# Patient Record
Sex: MALE | Race: WHITE | NOT HISPANIC OR LATINO | Employment: UNEMPLOYED | URBAN - METROPOLITAN AREA
[De-identification: names, ages, dates, MRNs, and addresses within clinical notes are randomized per-mention and may not be internally consistent; named-entity substitution may affect disease eponyms.]

---

## 2019-04-14 ENCOUNTER — HOSPITAL ENCOUNTER (EMERGENCY)
Facility: HOSPITAL | Age: 59
Discharge: HOME | End: 2019-04-14
Payer: MEDICAID

## 2019-04-14 PROCEDURE — 99283 EMERGENCY DEPT VISIT LOW MDM: CPT

## 2019-09-06 ENCOUNTER — HOSPITAL ENCOUNTER (EMERGENCY)
Facility: HOSPITAL | Age: 59
Discharge: HOME | End: 2019-09-06
Attending: EMERGENCY MEDICINE
Payer: MEDICAID

## 2019-09-06 ENCOUNTER — TELEPHONE (OUTPATIENT)
Dept: ORTHOPEDICS | Facility: CLINIC | Age: 59
End: 2019-09-06

## 2019-09-06 VITALS
WEIGHT: 298 LBS | HEIGHT: 78 IN | HEART RATE: 77 BPM | SYSTOLIC BLOOD PRESSURE: 131 MMHG | TEMPERATURE: 98 F | OXYGEN SATURATION: 95 % | BODY MASS INDEX: 34.48 KG/M2 | DIASTOLIC BLOOD PRESSURE: 78 MMHG | RESPIRATION RATE: 20 BRPM

## 2019-09-06 DIAGNOSIS — M25.569 KNEE PAIN: ICD-10-CM

## 2019-09-06 PROCEDURE — 99283 EMERGENCY DEPT VISIT LOW MDM: CPT | Mod: 25

## 2019-09-06 PROCEDURE — 29505 APPLICATION LONG LEG SPLINT: CPT | Mod: LT

## 2019-09-06 RX ORDER — ESCITALOPRAM OXALATE 20 MG/1
20 TABLET ORAL DAILY
COMMUNITY

## 2019-09-06 NOTE — TELEPHONE ENCOUNTER
Spoke w/ patient. The patient was given the medicaid hotline. -DD                  ----- Message from Ramona Alvarez sent at 9/6/2019  2:57 PM CDT -----  Contact: Yenni  Patient was seen in ER/[facility] and was referred to Ortho for two weeks follow up for a torn ACL and Meniscus, Please call him at [phone] or [phone].    Thanks  Td

## 2019-09-06 NOTE — ED PROVIDER NOTES
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 Pt reports he was working on a go cart when one side of the work horse he was working on gave away partially causing the go cart to slide into the lateral aspect of his left knee. Pt reports the pain as an aching pain. The pain does not radiate. Pt reports movement worsens the pain. Pt reports the pain is relieved mildly with rest. Pt denies any headache, chest pain, shortness of breath, chills, fatigue, head trauma, dysuria, incontinence     The history is provided by the patient.     Review of patient's allergies indicates:  No Known Allergies  Past Medical History:   Diagnosis Date    Depression      Past Surgical History:   Procedure Laterality Date    right knee surgery       History reviewed. No pertinent family history.  Social History     Tobacco Use    Smoking status: Current Every Day Smoker     Types: Cigarettes   Substance Use Topics    Alcohol use: Never     Frequency: Never    Drug use: Not on file     Review of Systems   Constitutional: Negative for chills, fatigue and fever.   HENT: Negative for sore throat.    Respiratory: Negative for shortness of breath.    Cardiovascular: Negative for chest pain.   Gastrointestinal: Negative for abdominal pain and nausea.   Genitourinary: Negative for dysuria.   Musculoskeletal: Positive for arthralgias (left knee). Negative for back pain.   Skin: Negative for rash.   Neurological: Negative for dizziness, weakness, light-headedness and headaches.   Hematological: Does not bruise/bleed easily.       Physical Exam     Initial Vitals [09/06/19 1130]   BP Pulse Resp Temp SpO2   (!) 150/76 78 20 98.4 °F (36.9 °C) 95 %      MAP       --         Physical Exam    Nursing note and  vitals reviewed.  Constitutional: He appears well-developed and well-nourished. He is not diaphoretic. No distress.   HENT:   Head: Normocephalic and atraumatic.   Eyes: Conjunctivae are normal. Right eye exhibits no discharge. Left eye exhibits no discharge.   Neck: Normal range of motion. Neck supple.   Cardiovascular: Normal rate and regular rhythm.   Pulmonary/Chest: Breath sounds normal. No respiratory distress. He has no wheezes. He has no rhonchi. He has no rales.   Abdominal: Soft. Bowel sounds are normal. There is no tenderness.   Musculoskeletal: He exhibits tenderness (left lateral knee TTP. pain with ROM left knee. no swelling, bruising, or erythema noted).   Neurological: He is alert and oriented to person, place, and time.   Skin: Skin is warm and dry.   Psychiatric: He has a normal mood and affect. His behavior is normal. Thought content normal.         ED Course   Orthopedic Injury  Date/Time: 9/6/2019 12:31 PM  Performed by: Keanu Teague NP  Authorized by: Jose Alejandro Muhammad Jr., MD     Consent Done?:  Yes  Universal Protocol:     Verbal consent obtained?: Yes      Risks and benefits: Risks, benefits and alternatives were discussed      Consent given by:  Patient    Patient states understanding of procedure being performed: Yes      Patient's understanding of procedure matches consent: Yes      Patient identity confirmed:  Name    Time Out: Immediately prior to the procedure a time out was called    Injury:     Injury location:  Knee    Location details:  Left knee      Pre-procedure assessment:     Neurovascular status: Neurovascularly intact      Distal perfusion: normal      Neurological function: normal      Range of motion: normal      Local anesthesia used?: No      Patient sedated?: No        Selections made in this section will also lock the Injury type section above.:     Immobilization:  Brace    Splint type: knee immobilizer.  Post-procedure assessment:     Neurovascular status:  Neurovascularly intact      Distal perfusion: normal      Neurological function: normal      Range of motion: normal      Patient tolerance:  Patient tolerated the procedure well with no immediate complications      Labs Reviewed - No data to display       Imaging Results          X-Ray Knee Complete 4 or More Views Left (Final result)  Result time 09/06/19 12:24:36    Final result by Oscar Snow MD (09/06/19 12:24:36)                 Impression:      No acute fracture or dislocation.    Moderate tricompartment degenerative changes and small joint effusion.      Electronically signed by: Oscar Snow MD  Date:    09/06/2019  Time:    12:24             Narrative:    EXAMINATION:  XR KNEE COMP 4 OR MORE VIEWS LEFT    CLINICAL HISTORY:  Pain in unspecified knee    COMPARISON:  None    FINDINGS:  No evidence of acute fracture or dislocation.  Bony mineralization is normal.  Soft tissues are unremarkable. Lateral view of the left knee demonstrates small joint effusion.    Moderate tricompartment degenerative changes greatest within the medial compartment with joint space narrowing sclerosis and marginal osteophytes.                                     12:32 PM  Results discussed with pt at this time. Follow up care addressed. All pt questions and concerns addressed. Pt instructed to return for any worsening or concerns. Pt verbalized understanding                  Clinical Impression:       ICD-10-CM ICD-9-CM   1. Knee pain M25.569 719.46                                Keanu Teague, KATERIN  09/06/19 1289

## 2021-08-24 LAB
CTP QC/QA: YES
SARS-COV-2 RDRP RESP QL NAA+PROBE: POSITIVE

## 2021-08-24 PROCEDURE — U0002 COVID-19 LAB TEST NON-CDC: HCPCS | Performed by: NURSE PRACTITIONER

## 2021-08-24 PROCEDURE — 99285 EMERGENCY DEPT VISIT HI MDM: CPT | Mod: 25

## 2021-08-25 ENCOUNTER — HOSPITAL ENCOUNTER (EMERGENCY)
Facility: HOSPITAL | Age: 61
Discharge: HOME | End: 2021-08-25
Attending: EMERGENCY MEDICINE
Payer: MEDICAID

## 2021-08-25 VITALS
RESPIRATION RATE: 24 BRPM | TEMPERATURE: 99 F | BODY MASS INDEX: 35.87 KG/M2 | HEART RATE: 110 BPM | WEIGHT: 310 LBS | SYSTOLIC BLOOD PRESSURE: 123 MMHG | OXYGEN SATURATION: 94 % | DIASTOLIC BLOOD PRESSURE: 60 MMHG | HEIGHT: 78 IN

## 2021-08-25 DIAGNOSIS — Z20.822 SUSPECTED COVID-19 VIRUS INFECTION: ICD-10-CM

## 2021-08-25 DIAGNOSIS — R06.02 SOB (SHORTNESS OF BREATH): ICD-10-CM

## 2021-08-25 DIAGNOSIS — U07.1 PNEUMONIA DUE TO COVID-19 VIRUS: Primary | ICD-10-CM

## 2021-08-25 DIAGNOSIS — J12.82 PNEUMONIA DUE TO COVID-19 VIRUS: Primary | ICD-10-CM

## 2021-08-25 LAB
ALBUMIN SERPL BCP-MCNC: 3.8 G/DL (ref 3.5–5.2)
ALP SERPL-CCNC: 44 U/L (ref 55–135)
ALT SERPL W/O P-5'-P-CCNC: 53 U/L (ref 10–44)
ANION GAP SERPL CALC-SCNC: 13 MMOL/L (ref 8–16)
AST SERPL-CCNC: 44 U/L (ref 10–40)
BASOPHILS # BLD AUTO: 0.04 K/UL (ref 0–0.2)
BASOPHILS NFR BLD: 0.7 % (ref 0–1.9)
BILIRUB SERPL-MCNC: 0.7 MG/DL (ref 0.1–1)
BUN SERPL-MCNC: 14 MG/DL (ref 8–23)
CALCIUM SERPL-MCNC: 9.5 MG/DL (ref 8.7–10.5)
CHLORIDE SERPL-SCNC: 104 MMOL/L (ref 95–110)
CK SERPL-CCNC: 121 U/L (ref 20–200)
CO2 SERPL-SCNC: 21 MMOL/L (ref 23–29)
CREAT SERPL-MCNC: 0.9 MG/DL (ref 0.5–1.4)
CRP SERPL-MCNC: 3.2 MG/L (ref 0–8.2)
DIFFERENTIAL METHOD: ABNORMAL
EOSINOPHIL # BLD AUTO: 0.1 K/UL (ref 0–0.5)
EOSINOPHIL NFR BLD: 1.3 % (ref 0–8)
ERYTHROCYTE [DISTWIDTH] IN BLOOD BY AUTOMATED COUNT: 13.7 % (ref 11.5–14.5)
EST. GFR  (AFRICAN AMERICAN): >60 ML/MIN/1.73 M^2
EST. GFR  (NON AFRICAN AMERICAN): >60 ML/MIN/1.73 M^2
FERRITIN SERPL-MCNC: 3516 NG/ML (ref 20–300)
GLUCOSE SERPL-MCNC: 131 MG/DL (ref 70–110)
HCT VFR BLD AUTO: 51.3 % (ref 40–54)
HCV AB SERPL QL IA: NEGATIVE
HEP C VIRUS HOLD SPECIMEN: NORMAL
HGB BLD-MCNC: 17.4 G/DL (ref 14–18)
HIV 1+2 AB+HIV1 P24 AG SERPL QL IA: NEGATIVE
IMM GRANULOCYTES # BLD AUTO: 0.01 K/UL (ref 0–0.04)
IMM GRANULOCYTES NFR BLD AUTO: 0.2 % (ref 0–0.5)
LACTATE SERPL-SCNC: 2.3 MMOL/L (ref 0.5–2.2)
LDH SERPL L TO P-CCNC: 157 U/L (ref 110–260)
LYMPHOCYTES # BLD AUTO: 1.6 K/UL (ref 1–4.8)
LYMPHOCYTES NFR BLD: 28.1 % (ref 18–48)
MCH RBC QN AUTO: 32.3 PG (ref 27–31)
MCHC RBC AUTO-ENTMCNC: 33.9 G/DL (ref 32–36)
MCV RBC AUTO: 95 FL (ref 82–98)
MONOCYTES # BLD AUTO: 0.8 K/UL (ref 0.3–1)
MONOCYTES NFR BLD: 13.7 % (ref 4–15)
NEUTROPHILS # BLD AUTO: 3.1 K/UL (ref 1.8–7.7)
NEUTROPHILS NFR BLD: 56 % (ref 38–73)
NRBC BLD-RTO: 0 /100 WBC
PLATELET # BLD AUTO: 168 K/UL (ref 150–450)
PMV BLD AUTO: 9.8 FL (ref 9.2–12.9)
POTASSIUM SERPL-SCNC: 3.7 MMOL/L (ref 3.5–5.1)
PROT SERPL-MCNC: 7.4 G/DL (ref 6–8.4)
RBC # BLD AUTO: 5.39 M/UL (ref 4.6–6.2)
SODIUM SERPL-SCNC: 138 MMOL/L (ref 136–145)
TROPONIN I SERPL DL<=0.01 NG/ML-MCNC: 0.01 NG/ML (ref 0–0.03)
WBC # BLD AUTO: 5.55 K/UL (ref 3.9–12.7)

## 2021-08-25 PROCEDURE — 83615 LACTATE (LD) (LDH) ENZYME: CPT | Performed by: EMERGENCY MEDICINE

## 2021-08-25 PROCEDURE — 83605 ASSAY OF LACTIC ACID: CPT | Performed by: EMERGENCY MEDICINE

## 2021-08-25 PROCEDURE — 99900035 HC TECH TIME PER 15 MIN (STAT)

## 2021-08-25 PROCEDURE — 93010 EKG 12-LEAD: ICD-10-PCS | Mod: ,,, | Performed by: INTERNAL MEDICINE

## 2021-08-25 PROCEDURE — 85025 COMPLETE CBC W/AUTO DIFF WBC: CPT | Performed by: EMERGENCY MEDICINE

## 2021-08-25 PROCEDURE — 86803 HEPATITIS C AB TEST: CPT | Performed by: EMERGENCY MEDICINE

## 2021-08-25 PROCEDURE — 80053 COMPREHEN METABOLIC PANEL: CPT | Performed by: EMERGENCY MEDICINE

## 2021-08-25 PROCEDURE — 82728 ASSAY OF FERRITIN: CPT | Performed by: EMERGENCY MEDICINE

## 2021-08-25 PROCEDURE — 87389 HIV-1 AG W/HIV-1&-2 AB AG IA: CPT | Performed by: EMERGENCY MEDICINE

## 2021-08-25 PROCEDURE — 93010 ELECTROCARDIOGRAM REPORT: CPT | Mod: ,,, | Performed by: INTERNAL MEDICINE

## 2021-08-25 PROCEDURE — 86140 C-REACTIVE PROTEIN: CPT | Performed by: EMERGENCY MEDICINE

## 2021-08-25 PROCEDURE — 94799 UNLISTED PULMONARY SVC/PX: CPT

## 2021-08-25 PROCEDURE — 84484 ASSAY OF TROPONIN QUANT: CPT | Performed by: EMERGENCY MEDICINE

## 2021-08-25 PROCEDURE — 82550 ASSAY OF CK (CPK): CPT | Performed by: EMERGENCY MEDICINE

## 2021-08-25 PROCEDURE — 93005 ELECTROCARDIOGRAM TRACING: CPT

## 2021-08-25 RX ORDER — LATANOPROST 50 UG/ML
SOLUTION/ DROPS OPHTHALMIC
COMMUNITY

## 2021-08-25 RX ORDER — ALBUTEROL SULFATE 90 UG/1
2 AEROSOL, METERED RESPIRATORY (INHALATION) EVERY 4 HOURS PRN
Qty: 607 G | Refills: 0 | Status: SHIPPED | OUTPATIENT
Start: 2021-08-25 | End: 2022-08-25

## 2021-08-25 RX ORDER — DORZOLAMIDE HYDROCHLORIDE AND TIMOLOL MALEATE 20; 5 MG/ML; MG/ML
SOLUTION/ DROPS OPHTHALMIC
COMMUNITY

## 2021-08-26 ENCOUNTER — INFUSION (OUTPATIENT)
Dept: INFECTIOUS DISEASES | Facility: HOSPITAL | Age: 61
End: 2021-08-26
Attending: EMERGENCY MEDICINE
Payer: MEDICAID

## 2021-08-26 VITALS
HEART RATE: 85 BPM | SYSTOLIC BLOOD PRESSURE: 122 MMHG | OXYGEN SATURATION: 94 % | RESPIRATION RATE: 19 BRPM | DIASTOLIC BLOOD PRESSURE: 75 MMHG | TEMPERATURE: 99 F

## 2021-08-26 DIAGNOSIS — U07.1 PNEUMONIA DUE TO COVID-19 VIRUS: ICD-10-CM

## 2021-08-26 DIAGNOSIS — J12.82 PNEUMONIA DUE TO COVID-19 VIRUS: ICD-10-CM

## 2021-08-26 DIAGNOSIS — U07.1 COVID-19: Primary | ICD-10-CM

## 2021-08-26 PROCEDURE — M0243 CASIRIVI AND IMDEVI INFUSION: HCPCS | Performed by: INTERNAL MEDICINE

## 2021-08-26 PROCEDURE — 25000003 PHARM REV CODE 250: Performed by: INTERNAL MEDICINE

## 2021-08-26 PROCEDURE — 63600175 PHARM REV CODE 636 W HCPCS: Performed by: INTERNAL MEDICINE

## 2021-08-26 RX ORDER — EPINEPHRINE 0.3 MG/.3ML
0.3 INJECTION SUBCUTANEOUS
Status: ACTIVE | OUTPATIENT
Start: 2021-08-26

## 2021-08-26 RX ORDER — SODIUM CHLORIDE 0.9 % (FLUSH) 0.9 %
10 SYRINGE (ML) INJECTION
Status: ACTIVE | OUTPATIENT
Start: 2021-08-26

## 2021-08-26 RX ORDER — ACETAMINOPHEN 325 MG/1
650 TABLET ORAL ONCE AS NEEDED
Status: ACTIVE | OUTPATIENT
Start: 2021-08-26 | End: 2033-01-22

## 2021-08-26 RX ORDER — DIPHENHYDRAMINE HYDROCHLORIDE 50 MG/ML
25 INJECTION INTRAMUSCULAR; INTRAVENOUS ONCE AS NEEDED
Status: ACTIVE | OUTPATIENT
Start: 2021-08-26 | End: 2033-01-22

## 2021-08-26 RX ORDER — ALBUTEROL SULFATE 90 UG/1
2 AEROSOL, METERED RESPIRATORY (INHALATION)
Status: ACTIVE | OUTPATIENT
Start: 2021-08-26

## 2021-08-26 RX ORDER — ONDANSETRON 4 MG/1
4 TABLET, ORALLY DISINTEGRATING ORAL ONCE AS NEEDED
Status: ACTIVE | OUTPATIENT
Start: 2021-08-26 | End: 2033-01-22

## 2021-08-26 RX ADMIN — CASIRIVIMAB AND IMDEVIMAB 600 MG: 600; 600 INJECTION, SOLUTION, CONCENTRATE INTRAVENOUS at 11:08

## 2025-05-12 ENCOUNTER — HOSPITAL ENCOUNTER (EMERGENCY)
Facility: HOSPITAL | Age: 65
Discharge: HOME | End: 2025-05-13
Attending: EMERGENCY MEDICINE
Payer: MEDICARE

## 2025-05-12 DIAGNOSIS — R07.9 CHEST PAIN: ICD-10-CM

## 2025-05-12 LAB
ABSOLUTE EOSINOPHIL (OHS): 0.42 K/UL
ABSOLUTE MONOCYTE (OHS): 0.98 K/UL (ref 0.3–1)
ABSOLUTE NEUTROPHIL COUNT (OHS): 4.44 K/UL (ref 1.8–7.7)
ALBUMIN SERPL BCP-MCNC: 4 G/DL (ref 3.5–5.2)
ALP SERPL-CCNC: 48 UNIT/L (ref 40–150)
ALT SERPL W/O P-5'-P-CCNC: 36 UNIT/L (ref 10–44)
ANION GAP (OHS): 9 MMOL/L (ref 8–16)
AST SERPL-CCNC: 27 UNIT/L (ref 11–45)
BASOPHILS # BLD AUTO: 0.05 K/UL
BASOPHILS NFR BLD AUTO: 0.5 %
BILIRUB SERPL-MCNC: 1.1 MG/DL (ref 0.1–1)
BUN SERPL-MCNC: 16 MG/DL (ref 8–23)
CALCIUM SERPL-MCNC: 9.5 MG/DL (ref 8.7–10.5)
CHLORIDE SERPL-SCNC: 108 MMOL/L (ref 95–110)
CO2 SERPL-SCNC: 23 MMOL/L (ref 23–29)
CREAT SERPL-MCNC: 0.8 MG/DL (ref 0.5–1.4)
D DIMER PPP IA.FEU-MCNC: 0.43 MG/L FEU
ERYTHROCYTE [DISTWIDTH] IN BLOOD BY AUTOMATED COUNT: 13.1 % (ref 11.5–14.5)
GFR SERPLBLD CREATININE-BSD FMLA CKD-EPI: >60 ML/MIN/1.73/M2
GLUCOSE SERPL-MCNC: 109 MG/DL (ref 70–110)
HCT VFR BLD AUTO: 51.5 % (ref 40–54)
HCV AB SERPL QL IA: NEGATIVE
HGB BLD-MCNC: 17 GM/DL (ref 14–18)
HIV 1+2 AB+HIV1 P24 AG SERPL QL IA: NEGATIVE
HOLD SPECIMEN: NORMAL
IMM GRANULOCYTES # BLD AUTO: 0.02 K/UL (ref 0–0.04)
IMM GRANULOCYTES NFR BLD AUTO: 0.2 % (ref 0–0.5)
LYMPHOCYTES # BLD AUTO: 3.23 K/UL (ref 1–4.8)
MCH RBC QN AUTO: 32.5 PG (ref 27–31)
MCHC RBC AUTO-ENTMCNC: 33 G/DL (ref 32–36)
MCV RBC AUTO: 99 FL (ref 82–98)
NUCLEATED RBC (/100WBC) (OHS): 0 /100 WBC
PLATELET # BLD AUTO: 237 K/UL (ref 150–450)
PMV BLD AUTO: 9.8 FL (ref 9.2–12.9)
POTASSIUM SERPL-SCNC: 4 MMOL/L (ref 3.5–5.1)
PROT SERPL-MCNC: 7.9 GM/DL (ref 6–8.4)
RBC # BLD AUTO: 5.23 M/UL (ref 4.6–6.2)
RELATIVE EOSINOPHIL (OHS): 4.6 %
RELATIVE LYMPHOCYTE (OHS): 35.3 % (ref 18–48)
RELATIVE MONOCYTE (OHS): 10.7 % (ref 4–15)
RELATIVE NEUTROPHIL (OHS): 48.7 % (ref 38–73)
SODIUM SERPL-SCNC: 140 MMOL/L (ref 136–145)
TROPONIN I SERPL DL<=0.01 NG/ML-MCNC: <0.006 NG/ML
TROPONIN I SERPL DL<=0.01 NG/ML-MCNC: <0.006 NG/ML
WBC # BLD AUTO: 9.14 K/UL (ref 3.9–12.7)

## 2025-05-12 PROCEDURE — 86803 HEPATITIS C AB TEST: CPT | Performed by: EMERGENCY MEDICINE

## 2025-05-12 PROCEDURE — 80053 COMPREHEN METABOLIC PANEL: CPT

## 2025-05-12 PROCEDURE — 93010 ELECTROCARDIOGRAM REPORT: CPT | Mod: ,,, | Performed by: INTERNAL MEDICINE

## 2025-05-12 PROCEDURE — 25000003 PHARM REV CODE 250: Performed by: EMERGENCY MEDICINE

## 2025-05-12 PROCEDURE — 63600175 PHARM REV CODE 636 W HCPCS: Performed by: EMERGENCY MEDICINE

## 2025-05-12 PROCEDURE — 85379 FIBRIN DEGRADATION QUANT: CPT

## 2025-05-12 PROCEDURE — 93005 ELECTROCARDIOGRAM TRACING: CPT

## 2025-05-12 PROCEDURE — 99285 EMERGENCY DEPT VISIT HI MDM: CPT | Mod: 25

## 2025-05-12 PROCEDURE — 25000242 PHARM REV CODE 250 ALT 637 W/ HCPCS: Performed by: EMERGENCY MEDICINE

## 2025-05-12 PROCEDURE — 96374 THER/PROPH/DIAG INJ IV PUSH: CPT

## 2025-05-12 PROCEDURE — 87389 HIV-1 AG W/HIV-1&-2 AB AG IA: CPT | Performed by: EMERGENCY MEDICINE

## 2025-05-12 PROCEDURE — 84484 ASSAY OF TROPONIN QUANT: CPT

## 2025-05-12 PROCEDURE — 85025 COMPLETE CBC W/AUTO DIFF WBC: CPT

## 2025-05-12 RX ORDER — NITROGLYCERIN 0.4 MG/1
0.4 TABLET SUBLINGUAL EVERY 5 MIN PRN
Status: COMPLETED | OUTPATIENT
Start: 2025-05-12 | End: 2025-05-12

## 2025-05-12 RX ORDER — FUROSEMIDE 10 MG/ML
40 INJECTION INTRAMUSCULAR; INTRAVENOUS
Status: COMPLETED | OUTPATIENT
Start: 2025-05-12 | End: 2025-05-12

## 2025-05-12 RX ORDER — ASPIRIN 325 MG
325 TABLET ORAL
Status: COMPLETED | OUTPATIENT
Start: 2025-05-12 | End: 2025-05-12

## 2025-05-12 RX ADMIN — FUROSEMIDE 40 MG: 10 INJECTION, SOLUTION INTRAMUSCULAR; INTRAVENOUS at 09:05

## 2025-05-12 RX ADMIN — NITROGLYCERIN 0.4 MG: 0.4 TABLET SUBLINGUAL at 09:05

## 2025-05-12 RX ADMIN — ASPIRIN 325 MG ORAL TABLET 325 MG: 325 PILL ORAL at 09:05

## 2025-05-13 VITALS
TEMPERATURE: 99 F | OXYGEN SATURATION: 95 % | WEIGHT: 315 LBS | SYSTOLIC BLOOD PRESSURE: 117 MMHG | DIASTOLIC BLOOD PRESSURE: 63 MMHG | HEART RATE: 87 BPM | RESPIRATION RATE: 18 BRPM | BODY MASS INDEX: 38.09 KG/M2

## 2025-05-13 LAB
BNP SERPL-MCNC: 20 PG/ML (ref 0–99)
OHS QRS DURATION: 90 MS
OHS QTC CALCULATION: 454 MS

## 2025-05-13 PROCEDURE — 83880 ASSAY OF NATRIURETIC PEPTIDE: CPT | Performed by: EMERGENCY MEDICINE

## 2025-05-13 NOTE — ED PROVIDER NOTES
SCRIBE #1 NOTE: I, Salud Dorantes, am scribing for, and in the presence of, Fredis Haro MD. I have scribed the entire note.       History     Chief Complaint   Patient presents with    Chest Pain     Chest pain started 30 minutes ago. + shortness of breath, takes Eliquis for previous stroke.      Review of patient's allergies indicates:  No Known Allergies      History of Present Illness     HPI    5/12/2025, 8:13 PM  History obtained from the patient, medical records, and wife      History of Present Illness: Tyler Medrano is a 64 y.o. male patient with a PMHx of A-fib, hypertension,anxiety, and depression who presents to the Emergency Department for evaluation of chest pain which began 1 hour ago. Pt reports shortness of breath on exertion that prior to the onset of the chest pain. Pt was resting in bed when the chest pain began. Movement exacerbates the chest pain. Associated sxs include left-sided chest wall pain. Patient denies any cough, fever, dizziness, nausea, or vomiting. Pt takes Eliquis. No prior Tx specified.  No further complaints or concerns at this time.       Arrival mode: Personal Transportation    PCP: Marla, Primary Doctor        Past Medical History:  Past Medical History:   Diagnosis Date    Depression        Past Surgical History:  Past Surgical History:   Procedure Laterality Date    HERNIA REPAIR  07/2021    right knee surgery           Family History:  No family history on file.    Social History:  Social History     Tobacco Use    Smoking status: Every Day     Types: Cigarettes    Smokeless tobacco: Not on file   Substance and Sexual Activity    Alcohol use: Never    Drug use: Not on file    Sexual activity: Not on file        Review of Systems     Review of Systems   Constitutional:  Negative for fever.   HENT:  Negative for sore throat.    Respiratory:  Positive for shortness of breath. Negative for cough.    Cardiovascular:  Positive for chest pain.   Gastrointestinal:  Negative for  nausea and vomiting.   Genitourinary:  Negative for dysuria.   Musculoskeletal:  Positive for myalgias (left-sided chest wall). Negative for back pain.   Skin:  Negative for rash.   Neurological:  Negative for dizziness and weakness.   Hematological:  Does not bruise/bleed easily.   All other systems reviewed and are negative.       Physical Exam     Initial Vitals [05/12/25 1846]   BP Pulse Resp Temp SpO2   135/78 94 20 98.5 °F (36.9 °C) (!) 94 %      MAP       --          Physical Exam  Nursing Notes and Vital Signs Reviewed.  Constitutional: Patient is in no acute distress. Well-developed and well-nourished.  Head: Atraumatic. Normocephalic.  Eyes: PERRL. EOM intact. Conjunctivae are not pale. No scleral icterus.  ENT: Mucous membranes are moist. Oropharynx is clear and symmetric.    Neck: Supple. Full ROM. No lymphadenopathy.  Cardiovascular: Regular rate. Irregularly irregular rhythm. No murmurs, rubs, or gallops. Distal pulses are 2+ and symmetric.  Pulmonary/Chest: No respiratory distress. Clear to auscultation bilaterally. No wheezing or rales.  Abdominal: Soft and non-distended.  There is no tenderness.  No rebound, guarding, or rigidity.  Genitourinary: No CVA tenderness.  Musculoskeletal: Moves all extremities. No obvious deformities. No edema.  Skin: Warm and dry.  Neurological:  Alert, awake, and appropriate.  Normal speech.  No acute focal neurological deficits are appreciated.  Psychiatric: Normal affect. Good eye contact. Appropriate in content.     ED Course   Procedures  ED Vital Signs:  Vitals:    05/12/25 1846 05/12/25 2130 05/12/25 2200 05/12/25 2228   BP: 135/78 (!) 121/93 (!) 91/54 113/68   Pulse: 94 85 82 82   Resp: 20 18 20 20   Temp: 98.5 °F (36.9 °C)      TempSrc: Oral      SpO2: (!) 94% 96% (!) 94%    Weight: (!) 149.5 kg (329 lb 9.6 oz)       05/12/25 2300 05/12/25 2330 05/13/25 0000 05/13/25 0100   BP: 102/74 135/73 (!) 109/55 128/70   Pulse: 89 85 83 78   Resp: 19 13 16 19   Temp:        TempSrc:       SpO2: (!) 94% 95% 95% (!) 93%   Weight:        05/13/25 0130   BP: 117/63   Pulse: 87   Resp: 18   Temp:    TempSrc:    SpO2: 95%   Weight:        Abnormal Lab Results:  Labs Reviewed   COMPREHENSIVE METABOLIC PANEL - Abnormal       Result Value    Sodium 140      Potassium 4.0      Chloride 108      CO2 23      Glucose 109      BUN 16      Creatinine 0.8      Calcium 9.5      Protein Total 7.9      Albumin 4.0      Bilirubin Total 1.1 (*)     ALP 48      AST 27      ALT 36      Anion Gap 9      eGFR >60     CBC WITH DIFFERENTIAL - Abnormal    WBC 9.14      RBC 5.23      HGB 17.0      HCT 51.5      MCV 99 (*)     MCH 32.5 (*)     MCHC 33.0      RDW 13.1      Platelet Count 237      MPV 9.8      Nucleated RBC 0      Neut % 48.7      Lymph % 35.3      Mono % 10.7      Eos % 4.6      Basophil % 0.5      Imm Grans % 0.2      Neut # 4.44      Lymph # 3.23      Mono # 0.98      Eos # 0.42      Baso # 0.05      Imm Grans # 0.02     HEPATITIS C ANTIBODY - Normal    Hep C Ab Interp Negative     HIV 1 / 2 ANTIBODY - Normal    HIV 1/2 Ag/Ab Negative     TROPONIN I - Normal    Troponin-I <0.006     D DIMER, QUANTITATIVE - Normal    D-Dimer 0.43     TROPONIN I - Normal    Troponin-I <0.006     B-TYPE NATRIURETIC PEPTIDE - Normal    BNP 20     HEP C VIRUS HOLD SPECIMEN    Extra Tube Hold for add-ons.     CBC W/ AUTO DIFFERENTIAL    Narrative:     The following orders were created for panel order CBC auto differential.  Procedure                               Abnormality         Status                     ---------                               -----------         ------                     CBC with Differential[[id number]]       Abnormal            Final result                 Please view results for these tests on the individual orders.   B-TYPE NATRIURETIC PEPTIDE        All Lab Results:  Results for orders placed or performed during the hospital encounter of 05/12/25   Hepatitis C Antibody    Collection Time:  05/12/25  7:53 PM   Result Value Ref Range    Hep C Ab Interp Negative Negative   HCV Virus Hold Specimen    Collection Time: 05/12/25  7:53 PM   Result Value Ref Range    Extra Tube Hold for add-ons.    HIV 1/2 Ag/Ab (4th Gen)    Collection Time: 05/12/25  7:53 PM   Result Value Ref Range    HIV 1/2 Ag/Ab Negative Negative   Comprehensive metabolic panel    Collection Time: 05/12/25  7:53 PM   Result Value Ref Range    Sodium 140 136 - 145 mmol/L    Potassium 4.0 3.5 - 5.1 mmol/L    Chloride 108 95 - 110 mmol/L    CO2 23 23 - 29 mmol/L    Glucose 109 70 - 110 mg/dL    BUN 16 8 - 23 mg/dL    Creatinine 0.8 0.5 - 1.4 mg/dL    Calcium 9.5 8.7 - 10.5 mg/dL    Protein Total 7.9 6.0 - 8.4 gm/dL    Albumin 4.0 3.5 - 5.2 g/dL    Bilirubin Total 1.1 (H) 0.1 - 1.0 mg/dL    ALP 48 40 - 150 unit/L    AST 27 11 - 45 unit/L    ALT 36 10 - 44 unit/L    Anion Gap 9 8 - 16 mmol/L    eGFR >60 >60 mL/min/1.73/m2   Troponin I #1    Collection Time: 05/12/25  7:53 PM   Result Value Ref Range    Troponin-I <0.006 <=0.026 ng/mL   CBC with Differential    Collection Time: 05/12/25  7:53 PM   Result Value Ref Range    WBC 9.14 3.90 - 12.70 K/uL    RBC 5.23 4.60 - 6.20 M/uL    HGB 17.0 14.0 - 18.0 gm/dL    HCT 51.5 40.0 - 54.0 %    MCV 99 (H) 82 - 98 fL    MCH 32.5 (H) 27.0 - 31.0 pg    MCHC 33.0 32.0 - 36.0 g/dL    RDW 13.1 11.5 - 14.5 %    Platelet Count 237 150 - 450 K/uL    MPV 9.8 9.2 - 12.9 fL    Nucleated RBC 0 <=0 /100 WBC    Neut % 48.7 38 - 73 %    Lymph % 35.3 18 - 48 %    Mono % 10.7 4 - 15 %    Eos % 4.6 <=8 %    Basophil % 0.5 <=1.9 %    Imm Grans % 0.2 0.0 - 0.5 %    Neut # 4.44 1.8 - 7.7 K/uL    Lymph # 3.23 1 - 4.8 K/uL    Mono # 0.98 0.3 - 1 K/uL    Eos # 0.42 <=0.5 K/uL    Baso # 0.05 <=0.2 K/uL    Imm Grans # 0.02 0.00 - 0.04 K/uL   D dimer, quantitative    Collection Time: 05/12/25  7:53 PM   Result Value Ref Range    D-Dimer 0.43 <0.50 mg/L FEU   Troponin I #2    Collection Time: 05/12/25 10:53 PM   Result Value Ref  Range    Troponin-I <0.006 <=0.026 ng/mL   Brain natriuretic peptide    Collection Time: 05/13/25  1:01 AM   Result Value Ref Range    BNP 20 0 - 99 pg/mL       Imaging Results:  Imaging Results              X-Ray Chest AP Portable (Final result)  Result time 05/12/25 20:51:11      Final result by Oscar Snow MD (05/12/25 20:51:11)                   Impression:     Cardiomegaly and mild basilar interstitial edema.    Finalized on: 5/12/2025 8:51 PM By:  Oscar Snow MD  [facility]# [id number]      2025-05-12 20:53:19.000     [facility]               Narrative:    EXAM:  XR CHEST AP PORTABLE    CLINICAL HISTORY: Chest pain    COMPARISON: None available.    FINDINGS: No confluent airspace opacity or consolidation.  There is no evidence of pleural effusion, pneumothorax, or other acute pulmonary disease.  Cardiomegaly and mild basilar interstitial edema.  No acute osseous abnormality is evident.                                         The EKG was ordered, reviewed, and independently interpreted by the ED provider.  Interpretation time: 18:45  Rate: 106 BPM  Rhythm: atrial fibrillation with rapid ventricular response  Interpretation: Abnormal QRS-T angle, consider primary T wave abnormality. No STEMI.           The Emergency Provider reviewed the vital signs and test results, which are outlined above.     ED Discussion     1:35 AM: Reassessed pt at this time. Discussed with patient and/or family/caretaker all pertinent ED information and results. Discussed pt dx and plan of tx. Gave the patient all f/u and return to the ED instructions. All questions and concerns were addressed at this time. Patient and/or family/caretaker expresses understanding of information and instructions, and is comfortable with plan to discharge. Pt is stable for discharge.     I discussed with patient and/or family/caretaker that evaluation in the ED does not suggest any emergent or life threatening medical conditions requiring immediate  intervention beyond what was provided in the ED, and I believe patient is safe for discharge.  Regardless, an unremarkable evaluation in the ED does not preclude the development or presence of a serious of life threatening condition. As such, I instructed that the patient is to return immediately for any worsening or change in current symptoms.           Medical Decision Making  Amount and/or Complexity of Data Reviewed  Labs: ordered. Decision-making details documented in ED Course.  Radiology: ordered. Decision-making details documented in ED Course.  ECG/medicine tests: ordered and independent interpretation performed. Decision-making details documented in ED Course.    Risk  OTC drugs.  Prescription drug management.                ED Medication(s):  Medications   aspirin tablet 325 mg (325 mg Oral Given 5/12/25 2128)   nitroGLYCERIN SL tablet 0.4 mg (0.4 mg Sublingual Given 5/12/25 2147)   furosemide injection 40 mg (40 mg Intravenous Given 5/12/25 2128)       Discharge Medication List as of 5/13/2025  1:27 AM           Follow-up Information       [facility] In 2 days.    Specialty: Cardiology  Contact information:  [address] [place] [phone]             [facility] - Emergency Dept..    Specialty: Emergency Medicine  Why: As needed, If symptoms worsen  Contact information:  [address] [place] [phone]                               Scribe Attestation:   Scribe #1: I performed the above scribed service and the documentation accurately describes the services I performed. I attest to the accuracy of the note.     Attending:   Physician Attestation Statement for Scribe #1: I, Fredis Haro MD, personally performed the services described in this documentation, as scribed by Salud Dorantes, in my presence, and it is both accurate and complete.           Clinical Impression       ICD-10-CM ICD-9-CM   1. Chest pain  R07.9 786.50        Disposition:   Disposition: Discharged  Condition: Stable       Fredis Haro MD  05/13/25 0600

## 2025-05-13 NOTE — FIRST PROVIDER EVALUATION
Medical screening examination initiated.  I have conducted a focused provider triage encounter, findings are as follows:    Brief history of present illness:  64-year-old male with a past medical history significant of AFib presenting to the emergency department with complaints of chest pain and shortness of breath; symptoms started approximately 1 hour prior to arrival.  Patient describes the pain as a sharp pain over the left side of his chest.  Pain is nonradiating.  Patient reports that the pain is worse whenever he tries to take a deep breath.  Patient takes a with for his AFib; reports that he took his dose today.    Vitals:    05/12/25 1846   BP: 135/78   BP Location: Right arm   Pulse: 94   Resp: 20   Temp: 98.5 °F (36.9 °C)   TempSrc: Oral   SpO2: (!) 94%   Weight: (!) 149.5 kg (329 lb 9.6 oz)       Pertinent physical exam:  SpO2 94%; vital signs otherwise stable.  Radial pulse is irregular.  No adventitious lung sounds.    Brief workup plan:  Preliminary workup initiated; this workup will be continued and followed by the physician or advanced practice provider that is assigned to the patient when roomed.

## 2025-05-22 ENCOUNTER — OFFICE VISIT (OUTPATIENT)
Dept: CARDIOLOGY | Facility: CLINIC | Age: 65
End: 2025-05-22
Payer: MEDICARE

## 2025-05-22 ENCOUNTER — TELEPHONE (OUTPATIENT)
Dept: CARDIOLOGY | Facility: CLINIC | Age: 65
End: 2025-05-22

## 2025-05-22 VITALS
SYSTOLIC BLOOD PRESSURE: 80 MMHG | OXYGEN SATURATION: 94 % | HEART RATE: 94 BPM | BODY MASS INDEX: 36.45 KG/M2 | DIASTOLIC BLOOD PRESSURE: 54 MMHG | WEIGHT: 315 LBS | HEIGHT: 78 IN

## 2025-05-22 DIAGNOSIS — I42.0 DILATED CARDIOMYOPATHY: ICD-10-CM

## 2025-05-22 DIAGNOSIS — E66.01 SEVERE OBESITY (BMI 35.0-39.9) WITH COMORBIDITY: ICD-10-CM

## 2025-05-22 DIAGNOSIS — I11.0 HYPERTENSIVE HEART DISEASE WITH HEART FAILURE: ICD-10-CM

## 2025-05-22 DIAGNOSIS — E11.40 TYPE 2 DIABETES MELLITUS WITH DIABETIC NEUROPATHY, WITHOUT LONG-TERM CURRENT USE OF INSULIN: ICD-10-CM

## 2025-05-22 DIAGNOSIS — I69.398 CVA, OLD, ALTERATIONS OF SENSATIONS: ICD-10-CM

## 2025-05-22 DIAGNOSIS — I69.322 DYSARTHRIA FOLLOWING CEREBRAL INFARCTION: ICD-10-CM

## 2025-05-22 DIAGNOSIS — G47.33 OBSTRUCTIVE SLEEP APNEA SYNDROME: ICD-10-CM

## 2025-05-22 DIAGNOSIS — I25.10 CORONARY ARTERY DISEASE INVOLVING NATIVE CORONARY ARTERY OF NATIVE HEART WITHOUT ANGINA PECTORIS: ICD-10-CM

## 2025-05-22 DIAGNOSIS — I48.19 PERSISTENT ATRIAL FIBRILLATION: Primary | ICD-10-CM

## 2025-05-22 DIAGNOSIS — R20.9 CVA, OLD, ALTERATIONS OF SENSATIONS: ICD-10-CM

## 2025-05-22 DIAGNOSIS — I69.328 CVA, OLD, SPEECH/LANGUAGE DEFICIT: ICD-10-CM

## 2025-05-22 PROBLEM — I63.9 CEREBROVASCULAR ACCIDENT (CVA) DUE TO EMBOLISM: Status: ACTIVE | Noted: 2022-10-06

## 2025-05-22 PROCEDURE — 99999 PR PBB SHADOW E&M-EST. PATIENT-LVL V: CPT | Mod: PBBFAC,,, | Performed by: INTERNAL MEDICINE

## 2025-05-22 RX ORDER — SACUBITRIL AND VALSARTAN 49; 51 MG/1; MG/1
1 TABLET, FILM COATED ORAL 2 TIMES DAILY
COMMUNITY

## 2025-05-22 RX ORDER — LATANOPROST 50 UG/ML
SOLUTION/ DROPS OPHTHALMIC
COMMUNITY

## 2025-05-22 RX ORDER — TRAMADOL HYDROCHLORIDE 50 MG/1
50 TABLET, FILM COATED ORAL NIGHTLY PRN
COMMUNITY
Start: 2025-02-04

## 2025-05-22 RX ORDER — ESCITALOPRAM OXALATE 20 MG/1
20 TABLET ORAL
COMMUNITY

## 2025-05-22 RX ORDER — GABAPENTIN 300 MG/1
CAPSULE ORAL
COMMUNITY
Start: 2025-03-19

## 2025-05-22 RX ORDER — METOPROLOL TARTRATE 25 MG/1
25 TABLET, FILM COATED ORAL
COMMUNITY

## 2025-05-22 RX ORDER — METFORMIN HYDROCHLORIDE 500 MG/1
500 TABLET ORAL 2 TIMES DAILY
COMMUNITY

## 2025-05-22 RX ORDER — APIXABAN 5 MG/1
5 TABLET, FILM COATED ORAL
COMMUNITY
Start: 2025-05-19 | End: 2025-08-17

## 2025-05-22 RX ORDER — ATORVASTATIN CALCIUM 20 MG/1
20 TABLET, FILM COATED ORAL
COMMUNITY

## 2025-05-22 NOTE — TELEPHONE ENCOUNTER
Cardiology records requested from Dr. Shireen Cameron,   Fax number  [phone],   Fax sent successfully

## 2025-05-22 NOTE — PROGRESS NOTES
Subjective:   Patient ID:  Tyler Medrano is a 64 y.o. male who presents for evaluation of No chief complaint on file.      HPI  5/22/2025   History of Present Illness    Mr. Medrano presents today for follow up after recent ER visit for chest pain. He recently visited the ER for chest pain exacerbated by breathing and movement, where he received 3 doses of sublingual nitroglycerin. He reports dyspnea with exertion for 1 year. He has a history of cardiomyopathy with EF of 30% and AF. He has a history of stroke in 2022 with RUE weakness and speech difficulties. He was recently diagnosed with diabetes with an A1C of 6.5. He has sleep apnea but is unable to use CPAP due to claustrophobia. He underwent knee surgery in November and is awaiting surgery on the other knee. He takes Entresto 49/51 twice daily, Eliquis, Lipitor, and Metformin as prescribed with good compliance. He has made recent dietary changes, including eliminating candy and cookies, reducing carbohydrate intake, and increasing vegetable consumption. He follows the same diet as his family members to support his health goals. He is retired following stroke and is a former smoker who quit after the stroke event.      ROS:  General: -fever, -chills, -fatigue, -weight gain, -weight loss  Eyes: -vision changes, -redness, -discharge  ENT: -ear pain, -nasal congestion, -sore throat  Cardiovascular: +chest pain, -palpitations, -lower extremity edema, +pain with movement, +pain with respiration  Respiratory: -cough, +shortness of breath, +exertional dyspnea, +intermittent breathing while sleeping, +apnea  Gastrointestinal: -abdominal pain, -nausea, -vomiting, -diarrhea, -constipation, -blood in stool  Genitourinary: -dysuria, -hematuria, -frequency  Musculoskeletal: -joint pain, -muscle pain, +calf pain, +lower extremity pain with movement  Skin: -rash, -lesion  Neurological: -headache, -dizziness, -numbness, +tingling, +speech difficulty  Psychiatric: +anxiety,  -depression, -sleep difficulty, +feeling of suffocation              Past Medical History:   Diagnosis Date    Atrial fibrillation     Cardiomyopathy     CHF (congestive heart failure)     Depression     Diabetes mellitus     Hyperlipidemia     Hypertension     Sleep apnea     Stroke        Past Surgical History:   Procedure Laterality Date    HERNIA REPAIR  07/2021    right knee surgery         Social History[1]    No family history on file.    Current Outpatient Medications   Medication Sig    atorvastatin (LIPITOR) 20 MG tablet Take 20 mg by mouth.    dorzolamide-timolol 2-0.5% (COSOPT) 22.3-6.8 mg/mL ophthalmic solution dorzolamide 22.3 mg-timolol 6.8 mg/mL eye drops   INSTILL ONE DROP INTO BOTH EYES TWICE DAILY    ELIQUIS 5 mg Tab Take 5 mg by mouth.    ENTRESTO 49-51 mg per tablet Take 1 tablet by mouth 2 (two) times daily.    EScitalopram oxalate (LEXAPRO) 20 MG tablet Take 20 mg by mouth.    gabapentin (NEURONTIN) 300 MG capsule Take 1 capsule by mouth in the morning and 1 capsule in the evening and 1 capsule before bedtime.    latanoprost 0.005 % ophthalmic solution latanoprost 0.005 % eye drops   INSTILL ONE DROP IN EACH EYE EVERY NIGHT AT BEDTIME    metFORMIN (GLUCOPHAGE) 500 MG tablet Take 500 mg by mouth 2 (two) times daily.    metoprolol tartrate (LOPRESSOR) 25 MG tablet Take 25 mg by mouth.    traMADoL (ULTRAM) 50 mg tablet Take 50 mg by mouth nightly as needed.    albuterol (PROVENTIL/VENTOLIN HFA) 90 mcg/actuation inhaler Inhale 2 puffs into the lungs every 4 (four) hours as needed for Shortness of Breath.    escitalopram oxalate (LEXAPRO) 20 MG tablet Take 20 mg by mouth once daily.    latanoprost 0.005 % ophthalmic solution 1 time each day at the same time.     Current Facility-Administered Medications   Medication    acetaminophen tablet 650 mg    albuterol inhaler 2 puff    diphenhydrAMINE injection 25 mg    EPINEPHrine (EPIPEN) 0.3 mg/0.3 mL pen injection 0.3 mg    methylPREDNISolone sodium  succinate injection 40 mg    ondansetron disintegrating tablet 4 mg    sodium chloride 0.9% 500 mL flush bag    sodium chloride 0.9% flush 10 mL     Current Outpatient Medications on File Prior to Visit   Medication Sig    atorvastatin (LIPITOR) 20 MG tablet Take 20 mg by mouth.    dorzolamide-timolol 2-0.5% (COSOPT) 22.3-6.8 mg/mL ophthalmic solution dorzolamide 22.3 mg-timolol 6.8 mg/mL eye drops   INSTILL ONE DROP INTO BOTH EYES TWICE DAILY    ELIQUIS 5 mg Tab Take 5 mg by mouth.    ENTRESTO 49-51 mg per tablet Take 1 tablet by mouth 2 (two) times daily.    EScitalopram oxalate (LEXAPRO) 20 MG tablet Take 20 mg by mouth.    gabapentin (NEURONTIN) 300 MG capsule Take 1 capsule by mouth in the morning and 1 capsule in the evening and 1 capsule before bedtime.    latanoprost 0.005 % ophthalmic solution latanoprost 0.005 % eye drops   INSTILL ONE DROP IN EACH EYE EVERY NIGHT AT BEDTIME    metFORMIN (GLUCOPHAGE) 500 MG tablet Take 500 mg by mouth 2 (two) times daily.    metoprolol tartrate (LOPRESSOR) 25 MG tablet Take 25 mg by mouth.    traMADoL (ULTRAM) 50 mg tablet Take 50 mg by mouth nightly as needed.    albuterol (PROVENTIL/VENTOLIN HFA) 90 mcg/actuation inhaler Inhale 2 puffs into the lungs every 4 (four) hours as needed for Shortness of Breath.    escitalopram oxalate (LEXAPRO) 20 MG tablet Take 20 mg by mouth once daily.    latanoprost 0.005 % ophthalmic solution 1 time each day at the same time.     Current Facility-Administered Medications on File Prior to Visit   Medication    acetaminophen tablet 650 mg    albuterol inhaler 2 puff    diphenhydrAMINE injection 25 mg    EPINEPHrine (EPIPEN) 0.3 mg/0.3 mL pen injection 0.3 mg    methylPREDNISolone sodium succinate injection 40 mg    ondansetron disintegrating tablet 4 mg    sodium chloride 0.9% 500 mL flush bag    sodium chloride 0.9% flush 10 mL       Review of patient's allergies indicates:  No Known Allergies    Objective:      Vitals:    05/22/25 1731  "05/22/25 1735   BP: 94/67 (!) 80/54   BP Location: Right arm Left arm   Patient Position: Sitting Sitting   Pulse: 94    SpO2: (!) 94%    Weight: (!) 146.7 kg (323 lb 6.6 oz)    Height: 6' 6" (1.981 m)      Body mass index is 37.37 kg/m².         Physical Exam  Vitals and nursing note reviewed.   Constitutional:       General: He is not in acute distress.     Appearance: He is well-developed. He is not diaphoretic.   HENT:      Head: Normocephalic and atraumatic.   Eyes:      General:         Right eye: No discharge.         Left eye: No discharge.      Pupils: Pupils are equal, round, and reactive to light.   Neck:      Thyroid: No thyromegaly.      Vascular: No JVD.   Cardiovascular:      Rate and Rhythm: Normal rate and regular rhythm.      Pulses: Normal pulses and intact distal pulses.      Heart sounds: Normal heart sounds. No murmur heard.     No friction rub. No gallop.   Pulmonary:      Effort: Pulmonary effort is normal. No respiratory distress.      Breath sounds: Normal breath sounds. No wheezing or rales.   Chest:      Chest wall: No tenderness.   Abdominal:      General: Bowel sounds are normal. There is no distension.      Palpations: Abdomen is soft.      Tenderness: There is no abdominal tenderness.   Musculoskeletal:         General: Normal range of motion.      Cervical back: Neck supple.      Right lower leg: No edema.      Left lower leg: No edema.   Skin:     General: Skin is warm and dry.      Findings: No erythema or rash.      Comments: REDNESS AND INCREASE HYPERPIGMENTATION SUGGESTIVE OF CHRONIC VENOUS CHANGES.   Neurological:      General: No focal deficit present.      Mental Status: He is alert and oriented to person, place, and time.      Cranial Nerves: No cranial nerve deficit.      Comments: HAS DYSARTHRIA.   Psychiatric:         Mood and Affect: Mood normal.         Behavior: Behavior normal.       Lab Results   Component Value Date    CHOL 156 10/07/2022     Lab Results   Component " "Value Date    HGBA1C 6.2 10/01/2024      BMP  Lab Results   Component Value Date     05/12/2025    K 4.0 05/12/2025     05/12/2025    CO2 23 05/12/2025    BUN 16 05/12/2025    CREATININE 0.8 05/12/2025    CALCIUM 9.5 05/12/2025    ANIONGAP 9 05/12/2025    EGFRNORACEVR >60 05/12/2025      Lab Results   Component Value Date    HDL 35 (L) 10/07/2022     Lab Results   Component Value Date    LDLCALC 94 10/07/2022     Lab Results   Component Value Date    TRIG 134 10/07/2022     No results found for: "CHOLHDL"    Chemistry        Component Value Date/Time     05/12/2025 1953     10/01/2024 0815     08/25/2021 0142    K 4.0 05/12/2025 1953    K 4.1 10/01/2024 0815    K 3.7 08/25/2021 0142     05/12/2025 1953     10/01/2024 0815     08/25/2021 0142    CO2 23 05/12/2025 1953    CO2 26 10/01/2024 0815    CO2 21 (L) 08/25/2021 0142    BUN 16 05/12/2025 1953    CREATININE 0.8 05/12/2025 1953     05/12/2025 1953     (H) 08/25/2021 0142        Component Value Date/Time    CALCIUM 9.5 05/12/2025 1953    CALCIUM 9.2 10/01/2024 0815    CALCIUM 9.5 08/25/2021 0142    ALKPHOS 48 05/12/2025 1953    ALKPHOS 44 (L) 08/25/2021 0142    AST 27 05/12/2025 1953    AST 44 (H) 08/25/2021 0142    ALT 36 05/12/2025 1953    ALT 53 (H) 08/25/2021 0142    BILITOT 1.1 (H) 05/12/2025 1953    BILITOT 0.7 08/25/2021 0142    ESTGFRAFRICA 89 10/01/2024 0815    ESTGFRAFRICA >60 08/25/2021 0142    EGFRNONAA >60 08/25/2021 0142          Lab Results   Component Value Date    TSH 1.121 10/06/2022     Lab Results   Component Value Date    INR 1.1 10/01/2024    INR 0.9 10/06/2022     Lab Results   Component Value Date    WBC 9.14 05/12/2025    HGB 17.0 05/12/2025    HCT 51.5 05/12/2025    MCV 99 (H) 05/12/2025     05/12/2025     BNP  @LABRCNTIP(BNP,BNPTRIAGEBLO)@  CrCl cannot be calculated (Patient's most recent lab result is older than the maximum 7 days allowed.).    Narrative & " Impression  EXAM:  XR CHEST AP PORTABLE     CLINICAL HISTORY: Chest pain     COMPARISON: None available.     FINDINGS: No confluent airspace opacity or consolidation.  There is no evidence of pleural effusion, pneumothorax, or other acute pulmonary disease.  Cardiomegaly and mild basilar interstitial edema.  No acute osseous abnormality is evident.        Impression:   Cardiomegaly and mild basilar interstitial edema.     Finalized on: 5/12/2025 8:51 PM By:  Oscar Snow MD  [facility]# [id number]      2025-05-12 20:53:19.000     [facility]    A1c 3/2025 6.5 ldl 41 tg 187  cr 1 tsh 1.08     CONCLUSIONS: 10/22  1. Normal left ventricular cavity size. Normal left ventricular systolic function. LVEF   55 - 65%. Abnormal diastolic function with normal left ventricular filling pressures.   Mild to moderate concentric hypertrophy. Normal wall motion.   2. Normal right ventricular size. Normal right ventricular systolic function.   3. No significant functional valvular abnormalities.   Assessment:     1. Persistent atrial fibrillation    2. Severe obesity (BMI 35.0-39.9) with comorbidity    3. Dysarthria following cerebral infarction    4. Dilated cardiomyopathy    5. Coronary artery disease involving native coronary artery of native heart without angina pectoris    6. Obstructive sleep apnea syndrome    7. Hypertensive heart disease with heart failure    8. Type 2 diabetes mellitus with diabetic neuropathy, without long-term current use of insulin    9. CVA, old, alterations of sensations    10. CVA, old, speech/language deficit        Plan:     Assessment & Plan    I42.0 Dilated cardiomyopathy  I48.91 Unspecified atrial fibrillation  I69.351 Hemiplegia and hemiparesis following cerebral infarction affecting right dominant side  I69.321 Dysphasia following cerebral infarction  R07.89 Other chest pain  R06.02 Shortness of breath  E11.9 Type 2 diabetes mellitus without complications  G47.33 Obstructive sleep apnea (adult)  (pediatric)  F40.298 Other specified phobia  E78.5 Hyperlipidemia, unspecified  Z87.891 Personal history of nicotine dependence  Z79.84 Long term (current) use of oral hypoglycemic drugs    IMPRESSION:  Not clearing patient for knee surgery due to insufficient cardiac information.  Considered more aggressive diabetes management with Ozempic and possibly Jardiance, pending cardiac evaluation results.  Low blood pressure noted, likely due to current cardiac medications.  Explained the need for further cardiac evaluation before clearing for surgery.    PLAN SUMMARY:  - Continue Entresto 49/51 twice daily  - Continue Metformin  - Continue Eliquis  - Continue Lipitor  - Ordered echocardiogram  - Ordered heart rhythm monitor to assess arrhythmias  - Referred to sleep apnea team for alternative treatments to CPAP  - Limit sodium intake to 2 g per day  - Restrict fluid intake to maximum 64 oz per day  - Follow a low carb diet, watch starches  - Avoid high-sodium foods and sugary drinks  - Follow up in 1 month    DILATED CARDIOMYOPATHY:  - Continued Entresto 49/51 twice daily.  - Ordered echocardiogram and heart rhythm monitor to assess arrhythmias.  - Go to the emergency department if filling up with fluid (indicated by worsening shortness of breath or leg swelling).  - Mr. Reedws to limit sodium intake to 2 g per day, restrict fluid intake to maximum 64 oz per day, follow a low carb diet, watch starches, avoid high-sodium foods like beef jerky, quit drinking Red Bull and sugary sodas.    ATRIAL FIBRILLATION:  - Continued Eliquis.    TYPE 2 DIABETES MELLITUS:  - Continued Metformin.    OBSTRUCTIVE SLEEP APNEA:  - Referred to sleep apnea team for evaluation of alternative treatments to CPAP.    HYPERLIPIDEMIA:  - Continued Lipitor.    SMOKING CESSATION:  - Continue to avoid smoking.    FOLLOW-UP:  - Follow up in 1 month.  - Contact the office if symptoms of shortness of breath worsen or if there is increased swelling in the  legs.          I HAVE REVIEWED THE CHART RECORDS FROM PREVIOUS ADMISSIONS THE FAMILY IS UNDER THE IMPRESSION FROM HIS CARDIOLOGIST THAT HIS LVF IS WEAK EF AROUND 30% ECHO FROM 2022 SHOWED NORMAL; LVF HE IS ON APPROPRIATE THERAPY WILL GET REPEAT ECHO TO ASSESS LVF SINCE HE HAS FCIII SYMPTOMS.  IN ADDITION HE LOOKS HE IS IN AFIB SINCE 2022 WILL GET HOLTER AND CONTINUE ELIQUIS   HE IS ALSO NON COMPLIANT WITH LOW SALT DIETA DN DIABETES COUNSELED he WILL BENEFIT FROM OZEMPIC JARDIANCE HOWEVER WOULD LIKE TO GET LVF ASSESSMENT AND MAKE DECISION ACCORDINGLY.  THE OTHER ISSUE IS HE IS LABELED AS DILATED CARDIOMYOPATHY BUT NO RECORDS AVAILABLE FOR ISCHEMIC WORK UP WILL MAKE DECISION IN THAT REGARD AFTER RECORDS REVIEW.    HE HAS NEUROPATHY SYMPTOMS HOWEVER HE IS AN EX SMOKER WITH DIABETES WILL OBTAIN PASCUAL  WILL GET F/U IN 1 MONTH AND MAKE FURTHER DECISION ACCORDINGLY   AT THIS STAGE HIS KNEE SURGERY HAS TO TAKE A BACK SEAT UNTIL FURTHER CLEAR CLINICAL PICTURE IS EVIDENT.  This note was generated with the assistance of ambient listening technology. Verbal consent was obtained by the patient and accompanying visitor(s) for the recording of patient appointment to facilitate this note. I attest to having reviewed and edited the generated note for accuracy, though some syntax or spelling errors may persist. Please contact the author of this note for any clarification.            [1]   Social History  Tobacco Use    Smoking status: Every Day     Types: Cigarettes   Substance Use Topics    Alcohol use: Never

## 2025-06-02 ENCOUNTER — TELEPHONE (OUTPATIENT)
Dept: CARDIOLOGY | Facility: CLINIC | Age: 65
End: 2025-06-02
Payer: MEDICARE

## 2025-06-02 ENCOUNTER — HOSPITAL ENCOUNTER (EMERGENCY)
Facility: HOSPITAL | Age: 65
Discharge: HOME | End: 2025-06-02
Attending: EMERGENCY MEDICINE
Payer: MEDICARE

## 2025-06-02 VITALS
BODY MASS INDEX: 36.7 KG/M2 | HEART RATE: 76 BPM | SYSTOLIC BLOOD PRESSURE: 122 MMHG | RESPIRATION RATE: 24 BRPM | OXYGEN SATURATION: 93 % | TEMPERATURE: 98 F | DIASTOLIC BLOOD PRESSURE: 75 MMHG | WEIGHT: 315 LBS

## 2025-06-02 DIAGNOSIS — R07.9 CHEST PAIN: ICD-10-CM

## 2025-06-02 DIAGNOSIS — I48.91 ATRIAL FIBRILLATION, UNSPECIFIED TYPE: ICD-10-CM

## 2025-06-02 DIAGNOSIS — Z13.6 SCREENING FOR CARDIOVASCULAR CONDITION: ICD-10-CM

## 2025-06-02 DIAGNOSIS — E86.0 DEHYDRATION: Primary | ICD-10-CM

## 2025-06-02 LAB
ABSOLUTE EOSINOPHIL (OHS): 0.46 K/UL
ABSOLUTE MONOCYTE (OHS): 0.67 K/UL (ref 0.3–1)
ABSOLUTE NEUTROPHIL COUNT (OHS): 3.65 K/UL (ref 1.8–7.7)
ALBUMIN SERPL BCP-MCNC: 4.1 G/DL (ref 3.5–5.2)
ALP SERPL-CCNC: 44 UNIT/L (ref 40–150)
ALT SERPL W/O P-5'-P-CCNC: 27 UNIT/L (ref 10–44)
ANION GAP (OHS): 13 MMOL/L (ref 8–16)
AST SERPL-CCNC: 21 UNIT/L (ref 11–45)
BASOPHILS # BLD AUTO: 0.06 K/UL
BASOPHILS NFR BLD AUTO: 0.8 %
BILIRUB SERPL-MCNC: 1.4 MG/DL (ref 0.1–1)
BILIRUB UR QL STRIP.AUTO: NEGATIVE
BNP SERPL-MCNC: 16 PG/ML (ref 0–99)
BUN SERPL-MCNC: 25 MG/DL (ref 8–23)
CALCIUM SERPL-MCNC: 9.9 MG/DL (ref 8.7–10.5)
CHLORIDE SERPL-SCNC: 103 MMOL/L (ref 95–110)
CLARITY UR: CLEAR
CO2 SERPL-SCNC: 22 MMOL/L (ref 23–29)
COLOR UR AUTO: YELLOW
CREAT SERPL-MCNC: 1 MG/DL (ref 0.5–1.4)
ERYTHROCYTE [DISTWIDTH] IN BLOOD BY AUTOMATED COUNT: 12.6 % (ref 11.5–14.5)
GFR SERPLBLD CREATININE-BSD FMLA CKD-EPI: >60 ML/MIN/1.73/M2
GLUCOSE SERPL-MCNC: 138 MG/DL (ref 70–110)
GLUCOSE UR QL STRIP: NEGATIVE
HCT VFR BLD AUTO: 49.4 % (ref 40–54)
HGB BLD-MCNC: 16.7 GM/DL (ref 14–18)
HGB UR QL STRIP: NEGATIVE
HOLD SPECIMEN: NORMAL
IMM GRANULOCYTES # BLD AUTO: 0.02 K/UL (ref 0–0.04)
IMM GRANULOCYTES NFR BLD AUTO: 0.3 % (ref 0–0.5)
INFLUENZA A MOLECULAR (OHS): NEGATIVE
INFLUENZA B MOLECULAR (OHS): NEGATIVE
INR PPP: 1 (ref 0.8–1.2)
KETONES UR QL STRIP: NEGATIVE
LACTATE SERPL-SCNC: 1.6 MMOL/L (ref 0.5–2.2)
LEUKOCYTE ESTERASE UR QL STRIP: NEGATIVE
LIPASE SERPL-CCNC: 73 U/L (ref 4–60)
LYMPHOCYTES # BLD AUTO: 2.31 K/UL (ref 1–4.8)
MAGNESIUM SERPL-MCNC: 1.9 MG/DL (ref 1.6–2.6)
MCH RBC QN AUTO: 32.4 PG (ref 27–31)
MCHC RBC AUTO-ENTMCNC: 33.8 G/DL (ref 32–36)
MCV RBC AUTO: 96 FL (ref 82–98)
NITRITE UR QL STRIP: NEGATIVE
NUCLEATED RBC (/100WBC) (OHS): 0 /100 WBC
PH UR STRIP: 6 [PH]
PLATELET # BLD AUTO: 244 K/UL (ref 150–450)
PMV BLD AUTO: 9.7 FL (ref 9.2–12.9)
POTASSIUM SERPL-SCNC: 4.2 MMOL/L (ref 3.5–5.1)
PROCALCITONIN SERPL-MCNC: <0.02 NG/ML
PROT SERPL-MCNC: 8.2 GM/DL (ref 6–8.4)
PROT UR QL STRIP: NEGATIVE
PROTHROMBIN TIME: 11.5 SECONDS (ref 9–12.5)
RBC # BLD AUTO: 5.16 M/UL (ref 4.6–6.2)
RELATIVE EOSINOPHIL (OHS): 6.4 %
RELATIVE LYMPHOCYTE (OHS): 32.2 % (ref 18–48)
RELATIVE MONOCYTE (OHS): 9.3 % (ref 4–15)
RELATIVE NEUTROPHIL (OHS): 51 % (ref 38–73)
SODIUM SERPL-SCNC: 138 MMOL/L (ref 136–145)
SP GR UR STRIP: 1.01
TROPONIN I SERPL DL<=0.01 NG/ML-MCNC: <0.006 NG/ML
TROPONIN I SERPL DL<=0.01 NG/ML-MCNC: <0.006 NG/ML
UROBILINOGEN UR STRIP-ACNC: NEGATIVE EU/DL
WBC # BLD AUTO: 7.17 K/UL (ref 3.9–12.7)

## 2025-06-02 PROCEDURE — 83735 ASSAY OF MAGNESIUM: CPT | Performed by: EMERGENCY MEDICINE

## 2025-06-02 PROCEDURE — 25000003 PHARM REV CODE 250: Performed by: EMERGENCY MEDICINE

## 2025-06-02 PROCEDURE — 96361 HYDRATE IV INFUSION ADD-ON: CPT

## 2025-06-02 PROCEDURE — 93005 ELECTROCARDIOGRAM TRACING: CPT

## 2025-06-02 PROCEDURE — 81003 URINALYSIS AUTO W/O SCOPE: CPT | Performed by: EMERGENCY MEDICINE

## 2025-06-02 PROCEDURE — 99285 EMERGENCY DEPT VISIT HI MDM: CPT | Mod: 25

## 2025-06-02 PROCEDURE — 85025 COMPLETE CBC W/AUTO DIFF WBC: CPT

## 2025-06-02 PROCEDURE — 80053 COMPREHEN METABOLIC PANEL: CPT

## 2025-06-02 PROCEDURE — 87040 BLOOD CULTURE FOR BACTERIA: CPT | Performed by: EMERGENCY MEDICINE

## 2025-06-02 PROCEDURE — 93010 ELECTROCARDIOGRAM REPORT: CPT | Mod: ,,, | Performed by: INTERNAL MEDICINE

## 2025-06-02 PROCEDURE — 83605 ASSAY OF LACTIC ACID: CPT | Performed by: EMERGENCY MEDICINE

## 2025-06-02 PROCEDURE — 83690 ASSAY OF LIPASE: CPT | Performed by: EMERGENCY MEDICINE

## 2025-06-02 PROCEDURE — 83880 ASSAY OF NATRIURETIC PEPTIDE: CPT

## 2025-06-02 PROCEDURE — 84484 ASSAY OF TROPONIN QUANT: CPT | Performed by: EMERGENCY MEDICINE

## 2025-06-02 PROCEDURE — 85610 PROTHROMBIN TIME: CPT | Performed by: EMERGENCY MEDICINE

## 2025-06-02 PROCEDURE — 87502 INFLUENZA DNA AMP PROBE: CPT | Performed by: EMERGENCY MEDICINE

## 2025-06-02 PROCEDURE — 84145 PROCALCITONIN (PCT): CPT | Performed by: EMERGENCY MEDICINE

## 2025-06-02 PROCEDURE — 96360 HYDRATION IV INFUSION INIT: CPT

## 2025-06-02 PROCEDURE — 84484 ASSAY OF TROPONIN QUANT: CPT

## 2025-06-02 RX ORDER — TIMOLOL MALEATE 2.5 MG/ML
SOLUTION/ DROPS OPHTHALMIC
COMMUNITY

## 2025-06-02 RX ADMIN — SODIUM CHLORIDE 2742 ML: 9 INJECTION, SOLUTION INTRAVENOUS at 06:06

## 2025-06-02 RX ADMIN — SODIUM CHLORIDE 1000 ML: 0.9 INJECTION, SOLUTION INTRAVENOUS at 08:06

## 2025-06-03 LAB
OHS QRS DURATION: 92 MS
OHS QTC CALCULATION: 432 MS

## 2025-06-07 LAB
BACTERIA BLD CULT: NORMAL
BACTERIA BLD CULT: NORMAL

## 2025-06-16 ENCOUNTER — OFFICE VISIT (OUTPATIENT)
Dept: PULMONOLOGY | Facility: CLINIC | Age: 65
End: 2025-06-16
Payer: MEDICARE

## 2025-06-16 VITALS
RESPIRATION RATE: 18 BRPM | OXYGEN SATURATION: 92 % | HEIGHT: 78 IN | DIASTOLIC BLOOD PRESSURE: 60 MMHG | SYSTOLIC BLOOD PRESSURE: 125 MMHG | BODY MASS INDEX: 36.45 KG/M2 | WEIGHT: 315 LBS | HEART RATE: 59 BPM

## 2025-06-16 DIAGNOSIS — G47.33 OBSTRUCTIVE SLEEP APNEA SYNDROME: ICD-10-CM

## 2025-06-16 DIAGNOSIS — E66.01 SEVERE OBESITY (BMI 35.0-39.9) WITH COMORBIDITY: ICD-10-CM

## 2025-06-16 DIAGNOSIS — J44.9 CHRONIC OBSTRUCTIVE PULMONARY DISEASE, UNSPECIFIED COPD TYPE: ICD-10-CM

## 2025-06-16 DIAGNOSIS — Z87.891 STOPPED SMOKING WITH GREATER THAN 40 PACK YEAR HISTORY: ICD-10-CM

## 2025-06-16 DIAGNOSIS — Z86.79 HISTORY OF ATRIAL FIBRILLATION: ICD-10-CM

## 2025-06-16 DIAGNOSIS — I69.328 CVA, OLD, SPEECH/LANGUAGE DEFICIT: ICD-10-CM

## 2025-06-16 PROCEDURE — 1160F RVW MEDS BY RX/DR IN RCRD: CPT | Mod: CPTII,S$GLB,, | Performed by: INTERNAL MEDICINE

## 2025-06-16 PROCEDURE — 1101F PT FALLS ASSESS-DOCD LE1/YR: CPT | Mod: CPTII,S$GLB,, | Performed by: INTERNAL MEDICINE

## 2025-06-16 PROCEDURE — 1159F MED LIST DOCD IN RCRD: CPT | Mod: CPTII,S$GLB,, | Performed by: INTERNAL MEDICINE

## 2025-06-16 PROCEDURE — 3288F FALL RISK ASSESSMENT DOCD: CPT | Mod: CPTII,S$GLB,, | Performed by: INTERNAL MEDICINE

## 2025-06-16 PROCEDURE — 4010F ACE/ARB THERAPY RXD/TAKEN: CPT | Mod: CPTII,S$GLB,, | Performed by: INTERNAL MEDICINE

## 2025-06-16 PROCEDURE — 3008F BODY MASS INDEX DOCD: CPT | Mod: CPTII,S$GLB,, | Performed by: INTERNAL MEDICINE

## 2025-06-16 PROCEDURE — 99204 OFFICE O/P NEW MOD 45 MIN: CPT | Mod: S$GLB,,, | Performed by: INTERNAL MEDICINE

## 2025-06-16 PROCEDURE — 3078F DIAST BP <80 MM HG: CPT | Mod: CPTII,S$GLB,, | Performed by: INTERNAL MEDICINE

## 2025-06-16 PROCEDURE — 3074F SYST BP LT 130 MM HG: CPT | Mod: CPTII,S$GLB,, | Performed by: INTERNAL MEDICINE

## 2025-06-16 PROCEDURE — 99999 PR PBB SHADOW E&M-EST. PATIENT-LVL V: CPT | Mod: PBBFAC,,, | Performed by: INTERNAL MEDICINE

## 2025-06-16 RX ORDER — HYDROXYZINE PAMOATE 50 MG/1
50 CAPSULE ORAL 3 TIMES DAILY PRN
COMMUNITY

## 2025-06-16 RX ORDER — METOPROLOL TARTRATE 25 MG/1
25 TABLET, FILM COATED ORAL
COMMUNITY

## 2025-06-16 RX ORDER — ALBUTEROL SULFATE 90 UG/1
2 INHALANT RESPIRATORY (INHALATION) EVERY 4 HOURS PRN
Qty: 18 G | Refills: 5 | Status: SHIPPED | OUTPATIENT
Start: 2025-06-16 | End: 2026-06-16

## 2025-06-16 NOTE — PROGRESS NOTES
Initial Outpatient Pulmonary Evaluation       SUBJECTIVE:     Chief Complaint   Patient presents with    Sleep Apnea       History of Present Illness:    Patient is a 64 y.o. male     History of Present Illness    CHIEF COMPLAINT:  Patient presents today for follow up of obstructive sleep apnea    SLEEP APNEA:  He underwent a sleep study approximately 3 years ago. He cannot tolerate CPAP machine due to feeling smothered while using the device.    RESPIRATORY:  He has a history of wheezing episodes requiring use of borrowed albuterol inhaler. He experienced recent illness with suspected bronchitis requiring increased albuterol use.    MEDICAL HISTORY:  His history is notable for stroke two years ago, atrial fibrillation, and recent knee surgery with ongoing recovery.    SOCIAL HISTORY:  He is a former smoker who quit in  during the COVID pandemic. Prior to cessation, he had a 45-year smoking history with consumption of 1 pack per day.    FAMILY HISTORY:  His mother  of bone cancer.    CURRENT MEDICATIONS:  He is using borrowed albuterol inhaler as needed for wheezing symptoms.           Review of Systems   Respiratory:  Positive for apnea, snoring, cough, shortness of breath, wheezing, dyspnea on extertion and use of rescue inhaler.    Psychiatric/Behavioral:  Positive for sleep disturbance.        Review of patient's allergies indicates:  No Known Allergies    Current Medications[1]    Past Medical History:   Diagnosis Date    Atrial fibrillation     Cardiomyopathy     CHF (congestive heart failure)     Depression     Diabetes mellitus     H/O knee surgery     [facility]     Hypertension     Sleep apnea     Stroke      Past Surgical History:   Procedure Laterality Date    HERNIA REPAIR  2021    right knee surgery       No family history on file.  Social History[2]       OBJECTIVE:     Vital Signs (Most Recent)  Vital Signs  Pulse: (!) 59  Resp:  "18  SpO2: (!) 92 %  BP: 125/60  Patient Position: Sitting  Height and Weight  Height: 6' 6" (198.1 cm)  Weight: (!) 145.4 kg (320 lb 8.8 oz)  BSA (Calculated - sq m): 2.83 sq meters  BMI (Calculated): 37.1  Weight in (lb) to have BMI = 25: 215.9]  Wt Readings from Last 2 Encounters:   06/16/25 (!) 145.4 kg (320 lb 8.8 oz)   06/02/25 (!) 144.1 kg (317 lb 9.6 oz)         Physical Exam:  Physical Exam   Constitutional: He is oriented to person, place, and time. He appears well-developed and well-nourished. No distress.   Cardiovascular: Normal rate.   Irregular   Pulmonary/Chest: Normal expansion and effort normal. No stridor. No respiratory distress. He has decreased breath sounds. He has wheezes.   Neurological: He is alert and oriented to person, place, and time. Gait abnormal.   Psychiatric: He has a normal mood and affect. His behavior is normal. Judgment and thought content normal.   Nursing note and vitals reviewed.      Laboratory  Lab Results   Component Value Date    WBC 7.17 06/02/2025    RBC 5.16 06/02/2025    HGB 16.7 06/02/2025    HCT 49.4 06/02/2025    MCV 96 06/02/2025    MCH 32.4 (H) 06/02/2025    MCHC 33.8 06/02/2025    RDW 12.6 06/02/2025     06/02/2025    MPV 9.7 06/02/2025    GRAN 3.1 08/25/2021    GRAN 56.0 08/25/2021    LYMPH 32.2 06/02/2025    LYMPH 2.31 06/02/2025    MONO 9.3 06/02/2025    MONO 0.67 06/02/2025    EOS 6.4 06/02/2025    EOS 0.46 06/02/2025    BASO 0.04 08/25/2021    EOSINOPHIL 1.3 08/25/2021    BASOPHIL 0.8 06/02/2025    BASOPHIL 0.06 06/02/2025       BMP  Lab Results   Component Value Date     06/02/2025    K 4.2 06/02/2025     06/02/2025    CO2 22 (L) 06/02/2025    BUN 25 (H) 06/02/2025    CREATININE 1.0 06/02/2025    CALCIUM 9.9 06/02/2025    ANIONGAP 13 06/02/2025    ESTGFRAFRICA 89 10/01/2024    EGFRNONAA >60 08/25/2021    AST 21 06/02/2025    ALT 27 06/02/2025    PROT 8.2 06/02/2025       Lab Results   Component Value Date    BNP 16 06/02/2025    BNP 20 " "05/13/2025       Lab Results   Component Value Date    TSH 1.121 10/06/2022       No results found for: "SEDRATE"    Lab Results   Component Value Date    CRP 3.2 08/25/2021       No results found for: "IGE"    No results found for: "ASPERGILLUS"  No results found for: "AFUMIGATUSCL"     No results found for: "ACE"    Diagnostic Results:  I have personally reviewed today the following studies :    AS ABOVE    ASSESSMENT/PLAN:   Assessment & Plan    E66.01 Severe obesity (BMI 35.0-39.9) with comorbidity  J44.9 Chronic obstructive pulmonary disease, unspecified COPD type  G47.33 Obstructive sleep apnea syndrome  I69.328 CVA, old, speech/language deficit  Z86.79 History of atrial fibrillation  Z87.891 Stopped smoking with greater than 40 pack year history    IMPRESSION:  - Obstructive sleep apnea diagnosed ~3 years ago but unable to tolerate CPAP with full face mask.  - History of stroke 2 years ago, currently under care of Dr. Stewart (cardiologist).  - Former smoker, quit in 2020 after ~45 years of smoking 1 pack/day.  - Suspect COPD given smoking history, requires further evaluation.  - Currently in a-fib, which may be exacerbated by untreated sleep apnea.  - Plan to retest for sleep apnea due to outdated study and prescribe nasal mask if positive.  - Discussed COPD as more likely diagnosis than asthma given smoking history.    CHRONIC OBSTRUCTIVE PULMONARY DISEASE, UNSPECIFIED COPD TYPE:  - Started albuterol inhaler to be used as needed every 4-6 hours for coughing, wheezing, and shortness of breath.  - Ordered pulmonary function tests and walking test to assess COPD severity and determine need for daily inhaler.  - Nurse will contact patient to arrange breathing tests appointments.    OBSTRUCTIVE SLEEP APNEA SYNDROME:  - Explained connection between untreated sleep apnea and difficulty controlling a-fib.  - [facility] (pending approval); sleep center will contact patient to " schedule.    HISTORY OF ATRIAL FIBRILLATION:  - Explained connection between untreated sleep apnea and difficulty controlling a-fib.    STOPPED SMOKING WITH GREATER THAN 40 PACK YEAR HISTORY:  - Ordered lung cancer screening scan due to smoking history.  - Nurse will contact patient to arrange lung scan appointments.         Obstructive sleep apnea syndrome  -     Ambulatory referral/consult to Sleep Disorders  -     Polysomnogram (CPAP will be added if patient meets diagnostic criteria.); Future    CVA, old, speech/language deficit  -     Ambulatory referral/consult to Sleep Disorders  -     Polysomnogram (CPAP will be added if patient meets diagnostic criteria.); Future    Severe obesity (BMI 35.0-39.9) with comorbidity  -     Ambulatory referral/consult to Sleep Disorders    History of atrial fibrillation  -     Polysomnogram (CPAP will be added if patient meets diagnostic criteria.); Future    Chronic obstructive pulmonary disease, unspecified COPD type  -     Complete PFT with bronchodilator; Future  -     Stress test, pulmonary; Future  -     albuterol (PROVENTIL/VENTOLIN HFA) 90 mcg/actuation inhaler; Inhale 2 puffs into the lungs every 4 (four) hours as needed for Shortness of Breath.  Dispense: 18 g; Refill: 5    Stopped smoking with greater than 40 pack year history quit 2020  -     CT Chest Lung Screening Low Dose; Future; Expected date: 06/08/2026      History of ANTHONY and intolerance to CPAP with fullface mask.  If split use nasal mask.    Follow up in about 3 months (around 9/16/2025).    This note was prepared using voice recognition system and is likely to have sound alike errors that may have been overlooked even after proof reading.  Please call me with any questions    Discussed diagnosis, its evaluation, treatment and usual course. All questions answered.    Thank you for the courtesy of participating in the care of this patient    Tiana Streeter MD             [1]   Current Outpatient  Medications   Medication Sig Dispense Refill    atorvastatin (LIPITOR) 20 MG tablet Take 20 mg by mouth.      dorzolamide-timolol 2-0.5% (COSOPT) 22.3-6.8 mg/mL ophthalmic solution dorzolamide 22.3 mg-timolol 6.8 mg/mL eye drops   INSTILL ONE DROP INTO BOTH EYES TWICE DAILY      ELIQUIS 5 mg Tab Take 5 mg by mouth.      ENTRESTO 49-51 mg per tablet Take 1 tablet by mouth 2 (two) times daily.      EScitalopram oxalate (LEXAPRO) 20 MG tablet Take 20 mg by mouth.      gabapentin (NEURONTIN) 300 MG capsule Take 1 capsule by mouth in the morning and 1 capsule in the evening and 1 capsule before bedtime.      hydrOXYzine pamoate (VISTARIL) 50 MG Cap Take 50 mg by mouth 3 (three) times daily as needed.      latanoprost 0.005 % ophthalmic solution 1 time each day at the same time.      metFORMIN (GLUCOPHAGE) 500 MG tablet Take 500 mg by mouth 2 (two) times daily.      metoprolol tartrate (LOPRESSOR) 25 MG tablet Take 25 mg by mouth.      metoprolol tartrate (LOPRESSOR) 25 MG tablet Take 25 mg by mouth.      albuterol (PROVENTIL/VENTOLIN HFA) 90 mcg/actuation inhaler Inhale 2 puffs into the lungs every 4 (four) hours as needed for Shortness of Breath. 18 g 5    escitalopram oxalate (LEXAPRO) 20 MG tablet Take 20 mg by mouth once daily.      latanoprost 0.005 % ophthalmic solution latanoprost 0.005 % eye drops   INSTILL ONE DROP IN EACH EYE EVERY NIGHT AT BEDTIME      timolol maleate 0.25% (TIMOPTIC) 0.25 % Drop INSTILL ONE DROP IN EACH EYE TWICE DAILY make appointment      traMADoL (ULTRAM) 50 mg tablet Take 50 mg by mouth nightly as needed.       Current Facility-Administered Medications   Medication Dose Route Frequency Provider Last Rate Last Admin    acetaminophen tablet 650 mg  650 mg Oral Once PRN Jeronimo Lundberg MD        albuterol inhaler 2 puff  2 puff Inhalation Q20 Min PRN Jeronimo Lundberg MD        diphenhydrAMINE injection 25 mg  25 mg Intravenous Once PRN Jeronimo Lundberg MD        EPINEPHrine (EPIPEN)  0.3 mg/0.3 mL pen injection 0.3 mg  0.3 mg Intramuscular PRN Jeronimo Lundberg MD        methylPREDNISolone sodium succinate injection 40 mg  40 mg Intravenous Once PRN Jeronimo Lundberg MD        ondansetron disintegrating tablet 4 mg  4 mg Oral Once PRN Jeronimo Lundberg MD        sodium chloride 0.9% 500 mL flush bag   Intravenous PRN Jeronimo Lundberg MD        sodium chloride 0.9% flush 10 mL  10 mL Intravenous PRN Jeronimo Lundberg MD       [2]   Social History  Tobacco Use    Smoking status: Former     Types: Cigarettes   Substance Use Topics    Alcohol use: Never

## 2025-06-24 ENCOUNTER — HOSPITAL ENCOUNTER (OUTPATIENT)
Dept: CARDIOLOGY | Facility: HOSPITAL | Age: 65
Discharge: HOME | End: 2025-06-24
Attending: INTERNAL MEDICINE
Payer: MEDICARE

## 2025-06-24 ENCOUNTER — DOCUMENTATION ONLY (OUTPATIENT)
Dept: CARDIOLOGY | Facility: HOSPITAL | Age: 65
End: 2025-06-24
Payer: MEDICARE

## 2025-06-24 VITALS
BODY MASS INDEX: 36.45 KG/M2 | HEART RATE: 82 BPM | SYSTOLIC BLOOD PRESSURE: 114 MMHG | HEIGHT: 78 IN | WEIGHT: 315 LBS | DIASTOLIC BLOOD PRESSURE: 60 MMHG | HEIGHT: 78 IN | BODY MASS INDEX: 36.45 KG/M2 | WEIGHT: 315 LBS

## 2025-06-24 DIAGNOSIS — I48.19 PERSISTENT ATRIAL FIBRILLATION: ICD-10-CM

## 2025-06-24 DIAGNOSIS — E11.40 TYPE 2 DIABETES MELLITUS WITH DIABETIC NEUROPATHY, WITHOUT LONG-TERM CURRENT USE OF INSULIN: ICD-10-CM

## 2025-06-24 DIAGNOSIS — I42.0 DILATED CARDIOMYOPATHY: ICD-10-CM

## 2025-06-24 PROCEDURE — C8929 TTE W OR WO FOL WCON,DOPPLER: HCPCS

## 2025-06-24 PROCEDURE — 93227 XTRNL ECG REC<48 HR R&I: CPT | Mod: ,,, | Performed by: INTERNAL MEDICINE

## 2025-06-24 PROCEDURE — 25500020 PHARM REV CODE 255: Performed by: INTERNAL MEDICINE

## 2025-06-24 PROCEDURE — 93306 TTE W/DOPPLER COMPLETE: CPT | Mod: 26,,, | Performed by: INTERNAL MEDICINE

## 2025-06-24 PROCEDURE — 93922 UPR/L XTREMITY ART 2 LEVELS: CPT

## 2025-06-24 PROCEDURE — 93226 XTRNL ECG REC<48 HR SCAN A/R: CPT

## 2025-06-24 PROCEDURE — 93922 UPR/L XTREMITY ART 2 LEVELS: CPT | Mod: 26,,, | Performed by: INTERNAL MEDICINE

## 2025-06-24 RX ADMIN — HUMAN ALBUMIN MICROSPHERES AND PERFLUTREN 0.11 MG: 10; .22 INJECTION, SOLUTION INTRAVENOUS at 03:06

## 2025-06-24 NOTE — PROGRESS NOTES
Pt presented for an echocardiogram today.  This study was performed in conjunction with Optison contrast agent because of poor endocardial visualization.  Procedure was explained to the patient, he verbalized understanding and signed the consent.  Administered a total of 3 ml of Optison   Patient tolerated the procedure well.  IV discontinued, pressure dressing applied.

## 2025-06-25 ENCOUNTER — TELEPHONE (OUTPATIENT)
Dept: CARDIOLOGY | Facility: CLINIC | Age: 65
End: 2025-06-25
Payer: MEDICARE

## 2025-06-25 ENCOUNTER — RESULTS FOLLOW-UP (OUTPATIENT)
Dept: CARDIOLOGY | Facility: CLINIC | Age: 65
End: 2025-06-25

## 2025-06-25 DIAGNOSIS — I48.11 LONGSTANDING PERSISTENT ATRIAL FIBRILLATION: Primary | ICD-10-CM

## 2025-06-25 LAB
AORTIC ROOT ANNULUS: 2.8 CM
AORTIC SIZE INDEX (SOV): 0.9 CM/M2
AORTIC SIZE INDEX: 1.1 CM/M2
ASCENDING AORTA: 3 CM
AV INDEX (PROSTH): 0.63
AV MEAN GRADIENT: 2 MMHG
AV PEAK GRADIENT: 3 MMHG
AV VALVE AREA BY VELOCITY RATIO: 2.8 CM²
AV VALVE AREA: 2.6 CM²
AV VELOCITY RATIO: 0.67
BSA FOR ECHO PROCEDURE: 2.83 M2
CV ECHO LV RWT: 0.55 CM
DOP CALC AO PEAK VEL: 0.9 M/S
DOP CALC AO VTI: 17.5 CM
DOP CALC LVOT AREA: 4.2 CM2
DOP CALC LVOT DIAMETER: 2.3 CM
DOP CALC LVOT PEAK VEL: 0.6 M/S
DOP CALC LVOT STROKE VOLUME: 45.7 CM3
DOP CALC RVOT PEAK VEL: 0.45 M/S
DOP CALC RVOT VTI: 9.7 CM
DOP CALCLVOT PEAK VEL VTI: 11 CM
ECHO LV POSTERIOR WALL: 1.1 CM (ref 0.6–1.1)
EJECTION FRACTION: 60 %
FRACTIONAL SHORTENING: 30 % (ref 28–44)
INTERVENTRICULAR SEPTUM: 1 CM (ref 0.6–1.1)
IVRT: 79 MSEC
LA MAJOR: 4.5 CM
LA MINOR: 5.8 CM
LA WIDTH: 3.6 CM
LEFT ABI: 1.11
LEFT ARM BP: 114 MMHG
LEFT ATRIUM AREA SYSTOLIC (APICAL 2 CHAMBER): 20.34 CM2
LEFT ATRIUM AREA SYSTOLIC (APICAL 4 CHAMBER): 15.93 CM2
LEFT ATRIUM SIZE: 4.2 CM
LEFT ATRIUM VOLUME INDEX MOD: 18 ML/M2
LEFT ATRIUM VOLUME INDEX: 24 ML/M2
LEFT ATRIUM VOLUME MOD: 50 ML
LEFT ATRIUM VOLUME: 65 CM3
LEFT DORSALIS PEDIS: 123 MMHG
LEFT INTERNAL DIMENSION IN SYSTOLE: 2.8 CM (ref 2.1–4)
LEFT POSTERIOR TIBIAL: 127 MMHG
LEFT TBI: 0.97
LEFT TOE PRESSURE: 111 MMHG
LEFT VENTRICLE DIASTOLIC VOLUME INDEX: 25.36 ML/M2
LEFT VENTRICLE DIASTOLIC VOLUME: 70 ML
LEFT VENTRICLE END SYSTOLIC VOLUME APICAL 2 CHAMBER: 56.82 ML
LEFT VENTRICLE END SYSTOLIC VOLUME APICAL 4 CHAMBER: 38.43 ML
LEFT VENTRICLE MASS INDEX: 49.3 G/M2
LEFT VENTRICLE SYSTOLIC VOLUME INDEX: 10.9 ML/M2
LEFT VENTRICLE SYSTOLIC VOLUME: 30 ML
LEFT VENTRICULAR INTERNAL DIMENSION IN DIASTOLE: 4 CM (ref 3.5–6)
LEFT VENTRICULAR MASS: 136.2 G
LVED V (TEICH): 69.56 ML
LVES V (TEICH): 30.49 ML
LVOT MG: 0.79 MMHG
LVOT MV: 0.41 CM/S
MV PEAK E VEL: 0.63 M/S
OHS CV RV/LV RATIO: 0.9 CM
PISA TR MAX VEL: 2.1 M/S
PV MEAN GRADIENT: 1 MMHG
PV PEAK GRADIENT: 2 MMHG
PV PEAK VELOCITY: 0.68 M/S
RA MAJOR: 5.33 CM
RA PRESSURE ESTIMATED: 3 MMHG
RA WIDTH: 3.86 CM
RIGHT ABI: 1.05
RIGHT ARM BP: 112 MMHG
RIGHT DORSALIS PEDIS: 110 MMHG
RIGHT POSTERIOR TIBIAL: 120 MMHG
RIGHT TBI: 0.73
RIGHT TOE PRESSURE: 83 MMHG
RIGHT VENTRICLE DIASTOLIC BASEL DIMENSION: 3.6 CM
RIGHT VENTRICULAR END-DIASTOLIC DIMENSION: 3.55 CM
RV TB RVSP: 5 MMHG
SINUS: 2.5 CM
STJ: 2.6 CM
TR MAX PG: 18 MMHG
TV REST PULMONARY ARTERY PRESSURE: 21 MMHG
Z-SCORE OF LEFT VENTRICULAR DIMENSION IN END DIASTOLE: -20.73
Z-SCORE OF LEFT VENTRICULAR DIMENSION IN END SYSTOLE: -14.84

## 2025-06-25 NOTE — TELEPHONE ENCOUNTER
Called and spoke with wife and advised results and she voiced understanding     ----- Message from Liat Hope PA-C sent at 6/25/2025 12:50 PM CDT -----  PASCUAL reviewed and is normal/good blood flow.  ----- Message -----  From: Saroj Rene MD  Sent: 6/25/2025  10:07 AM CDT  To: Geo Blue MD

## 2025-06-25 NOTE — TELEPHONE ENCOUNTER
Please phone patient     Echo reviewed  Normal heart strength  Mild TR noted  Await rest of test ordered per Dr Blue.     Thanks  ELLEN DeP-C       called patient to advise, spoke to his wife verbalized understanding

## 2025-06-27 ENCOUNTER — TELEPHONE (OUTPATIENT)
Dept: CARDIOLOGY | Facility: CLINIC | Age: 65
End: 2025-06-27
Payer: MEDICARE

## 2025-06-27 LAB
OHS CV EVENT MONITOR DAY: 0
OHS CV HOLTER LENGTH DECIMAL HOURS: 48
OHS CV HOLTER LENGTH HOURS: 48
OHS CV HOLTER LENGTH MINUTES: 0
OHS CV HOLTER SINUS AVERAGE HR: 86
OHS CV HOLTER SINUS MAX HR: 182
OHS CV HOLTER SINUS MIN HR: 44

## 2025-06-27 NOTE — TELEPHONE ENCOUNTER
To call clinic to review -Holter reviewed. Persistent afib noted, as well as 3 brief runs of NSVT(non-sustained monomorphic ventricular tachycardia ).     Please make sure he is taking BB (metoprolol) and Eliquis.     We placed a  EP ( Electrophysiology) referral given afib, order placed, please call to schedule [phone]         ----- Message from Liat Hope PA-C sent at 6/27/2025  8:35 AM CDT -----    ----- Message -----  From: Fermin Leonard MD  Sent: 6/27/2025   8:05 AM CDT  To: Geo Blue MD

## 2025-07-02 ENCOUNTER — PATIENT MESSAGE (OUTPATIENT)
Dept: CARDIOLOGY | Facility: HOSPITAL | Age: 65
End: 2025-07-02
Payer: MEDICARE

## 2025-07-09 ENCOUNTER — HOSPITAL ENCOUNTER (OUTPATIENT)
Dept: RADIOLOGY | Facility: HOSPITAL | Age: 65
Discharge: HOME | End: 2025-07-09
Attending: INTERNAL MEDICINE
Payer: MEDICARE

## 2025-07-09 ENCOUNTER — CLINICAL SUPPORT (OUTPATIENT)
Dept: PULMONOLOGY | Facility: CLINIC | Age: 65
End: 2025-07-09
Attending: INTERNAL MEDICINE
Payer: MEDICARE

## 2025-07-09 VITALS — HEIGHT: 78 IN | BODY MASS INDEX: 36.45 KG/M2 | WEIGHT: 315 LBS

## 2025-07-09 DIAGNOSIS — J44.9 CHRONIC OBSTRUCTIVE PULMONARY DISEASE, UNSPECIFIED COPD TYPE: ICD-10-CM

## 2025-07-09 DIAGNOSIS — Z87.891 STOPPED SMOKING WITH GREATER THAN 40 PACK YEAR HISTORY: ICD-10-CM

## 2025-07-09 LAB
BRPFT: ABNORMAL
DLCO SINGLE BREATH LLN: 27.79
DLCO SINGLE BREATH PRE REF: 75.3 %
DLCO SINGLE BREATH REF: 34.71
DLCOC SBVA LLN: 3.02
DLCOC SBVA REF: 3.97
DLCOC SINGLE BREATH LLN: 27.79
DLCOC SINGLE BREATH REF: 34.71
DLCOVA LLN: 3.02
DLCOVA PRE REF: 110.2 %
DLCOVA PRE: 4.37 ML/(MIN*MMHG*L) (ref 3.02–4.92)
DLCOVA REF: 3.97
ERV LLN: -16448.68
ERV PRE REF: 97.7 %
ERV REF: 1.32
FEF 25 75 CHG: 20.5 %
FEF 25 75 LLN: 1.41
FEF 25 75 POST REF: 186.1 %
FEF 25 75 PRE REF: 154.5 %
FEF 25 75 REF: 3.12
FET100 CHG: -38.2 %
FEV1 CHG: 7.3 %
FEV1 FVC CHG: 10.1 %
FEV1 FVC LLN: 62
FEV1 FVC POST REF: 123.8 %
FEV1 FVC PRE REF: 112.5 %
FEV1 FVC REF: 75
FEV1 LLN: 3.1
FEV1 POST REF: 87.9 %
FEV1 PRE REF: 81.9 %
FEV1 REF: 4.23
FRCPLETH LLN: 3.15
FRCPLETH PREREF: 94.5 %
FRCPLETH REF: 4.14
FVC CHG: -2.5 %
FVC LLN: 4.25
FVC POST REF: 70.5 %
FVC PRE REF: 72.3 %
FVC REF: 5.67
IVC PRE: 4.31 L (ref 4.25–7.12)
IVC SINGLE BREATH LLN: 4.25
IVC SINGLE BREATH PRE REF: 76 %
IVC SINGLE BREATH REF: 5.67
MVV LLN: 140
MVV PRE REF: 73 %
MVV REF: 165
PEF CHG: -22.9 %
PEF LLN: 7.77
PEF POST REF: 77.9 %
PEF PRE REF: 101 %
PEF REF: 10.64
POST FEF 25 75: 5.81 L/S (ref 1.41–5.5)
POST FET 100: 2 SEC
POST FEV1 FVC: 93.03 % (ref 62.39–86.33)
POST FEV1: 3.72 L (ref 3.1–5.29)
POST FVC: 4 L (ref 4.25–7.12)
POST PEF: 8.29 L/S (ref 7.77–13.52)
PRE DLCO: 26.13 ML/(MIN*MMHG) (ref 27.79–41.64)
PRE ERV: 1.29 L (ref -16448.68–16451.32)
PRE FEF 25 75: 4.82 L/S (ref 1.41–5.5)
PRE FET 100: 3.24 SEC
PRE FEV1 FVC: 84.49 % (ref 62.39–86.33)
PRE FEV1: 3.46 L (ref 3.1–5.29)
PRE FRC PL: 3.91 L (ref 3.15–5.13)
PRE FVC: 4.1 L (ref 4.25–7.12)
PRE MVV: 120.22 L/MIN (ref 139.92–189.3)
PRE PEF: 10.74 L/S (ref 7.77–13.52)
PRE RV: 2.62 L (ref 2.14–3.49)
PRE TLC: 6.72 L (ref 7.6–9.9)
RAW LLN: 3.06
RAW PRE REF: 114 %
RAW PRE: 3.49 CMH2O*S/L (ref 3.06–3.06)
RAW REF: 3.06
RV LLN: 2.14
RV PRE REF: 93 %
RV REF: 2.82
RVTLC LLN: 31
RVTLC PRE REF: 98.2 %
RVTLC PRE: 38.99 % (ref 30.72–48.68)
RVTLC REF: 40
TLC LLN: 7.6
TLC PRE REF: 76.8 %
TLC REF: 8.75
VA PRE: 5.98 L (ref 8.6–8.6)
VA SINGLE BREATH LLN: 8.6
VA SINGLE BREATH PRE REF: 69.5 %
VA SINGLE BREATH REF: 8.6
VC LLN: 4.25
VC PRE REF: 72.3 %
VC PRE: 4.1 L (ref 4.25–7.12)
VC REF: 5.67

## 2025-07-09 PROCEDURE — 71271 CT THORAX LUNG CANCER SCR C-: CPT | Mod: TC

## 2025-07-09 PROCEDURE — 99999 PR PBB SHADOW E&M-EST. PATIENT-LVL I: CPT | Mod: PBBFAC,,,

## 2025-07-09 PROCEDURE — 71271 CT THORAX LUNG CANCER SCR C-: CPT | Mod: 26,,, | Performed by: RADIOLOGY

## 2025-07-09 NOTE — PROCEDURES
"[facility] - Pulmonary Function  Six Minute Walk     SUMMARY     Ordering Provider: Dr. Streeter   Interpreting Provider: Dr. Streeter  Performing nurse/tech/RT: EDEL Rothman, RRT  Diagnosis: COPD  Height: 6' 6" (198.1 cm)  Weight: (!) 144.7 kg (319 lb)  BMI (Calculated): 36.9                Phase Oxygen Assessment Supplemental O2 Heart   Rate Blood Pressure Anand Dyspnea Scale Rating   Resting 96 % Room Air 69 bpm 109/63 0   Exercise        Minute        1 94 % Room Air 112 bpm     2 95 % Room Air 129 bpm     3 95 % Room Air 146 bpm     4 95 % Room Air 130 bpm     5 95 % Room Air 140 bpm     6  96 % Room Air 141 bpm 115/85 2   Recovery        Minute        1 96 % Room Air 108 bpm     2 93 % Room Air 97 bpm     3 95 % Room Air 89 bpm     4 96 % Room Air 90 bpm 128/84 0     Six Minute Walk Summary  6MWT Status: completed without stopping  Patient Reported: Dyspnea (knee pain)     Interpretation:  Did the patient stop or pause?: No                                         Total Time Walked (Calculated): 360 seconds  Final Partial Lap Distance (feet): 100 feet  Total Distance Meters (Calculated): 274.32 meters  Predicted Distance Meters (Calculated): 604.63 meters  Percentage of Predicted (Calculated): 45.37  Peak VO2 (Calculated): 12.21  Mets: 3.49  Has The Patient Had a Previous Six Minute Walk Test?: No       Previous 6MWT Results  Has The Patient Had a Previous Six Minute Walk Test?: No    "

## 2025-07-13 DIAGNOSIS — Z87.891 STOPPED SMOKING WITH GREATER THAN 40 PACK YEAR HISTORY: Primary | ICD-10-CM

## 2025-07-13 DIAGNOSIS — J43.2 CENTRILOBULAR EMPHYSEMA: Primary | ICD-10-CM

## 2025-07-13 RX ORDER — UMECLIDINIUM BROMIDE AND VILANTEROL TRIFENATATE 62.5; 25 UG/1; UG/1
1 POWDER RESPIRATORY (INHALATION) DAILY
Qty: 60 EACH | Refills: 5 | Status: SHIPPED | OUTPATIENT
Start: 2025-07-13

## 2025-07-14 ENCOUNTER — LAB VISIT (OUTPATIENT)
Dept: LAB | Facility: HOSPITAL | Age: 65
End: 2025-07-14
Attending: PSYCHIATRY & NEUROLOGY
Payer: MEDICARE

## 2025-07-14 DIAGNOSIS — G61.81 CHRONIC INFLAMMATORY DEMYELINATING POLYNEURITIS: Primary | ICD-10-CM

## 2025-07-14 DIAGNOSIS — G61.81 DADS (DISTAL ACQUIRED DEMYELINATING SYMMETRIC NEUROPATHY): ICD-10-CM

## 2025-07-14 LAB
CRP SERPL-MCNC: 1.1 MG/L
ERYTHROCYTE [SEDIMENTATION RATE] IN BLOOD: 4 MM/HR
IGA SERPL-MCNC: 259 MG/DL (ref 40–350)
MAGNESIUM SERPL-MCNC: 1.9 MG/DL (ref 1.6–2.6)

## 2025-07-14 PROCEDURE — 84165 PROTEIN E-PHORESIS SERUM: CPT

## 2025-07-14 PROCEDURE — 82784 ASSAY IGA/IGD/IGG/IGM EACH: CPT

## 2025-07-14 PROCEDURE — 86335 IMMUNFIX E-PHORSIS/URINE/CSF: CPT

## 2025-07-14 PROCEDURE — 83735 ASSAY OF MAGNESIUM: CPT

## 2025-07-14 PROCEDURE — 86140 C-REACTIVE PROTEIN: CPT

## 2025-07-14 PROCEDURE — 86038 ANTINUCLEAR ANTIBODIES: CPT

## 2025-07-14 PROCEDURE — 85651 RBC SED RATE NONAUTOMATED: CPT

## 2025-07-14 PROCEDURE — 86335 IMMUNFIX E-PHORSIS/URINE/CSF: CPT | Mod: 26,,, | Performed by: PATHOLOGY

## 2025-07-14 PROCEDURE — 84165 PROTEIN E-PHORESIS SERUM: CPT | Mod: 26,,, | Performed by: PATHOLOGY

## 2025-07-14 PROCEDURE — 83521 IG LIGHT CHAINS FREE EACH: CPT

## 2025-07-14 PROCEDURE — 36415 COLL VENOUS BLD VENIPUNCTURE: CPT

## 2025-07-14 PROCEDURE — 83036 HEMOGLOBIN GLYCOSYLATED A1C: CPT | Mod: GA

## 2025-07-15 LAB
ALBUMIN, SPE (OHS): 4.04 G/DL (ref 3.35–5.55)
ALPHA 1 GLOB (OHS): 0.26 GM/DL (ref 0.17–0.41)
ALPHA 2 GLOB (OHS): 0.61 GM/DL (ref 0.43–0.99)
ANA (OHS): NORMAL
BETA GLOB (OHS): 0.92 GM/DL (ref 0.5–1.1)
EAG (OHS): 120 MG/DL (ref 68–131)
GAMMA GLOBULIN (OHS): 1.07 GM/DL (ref 0.67–1.58)
HBA1C MFR BLD: 5.8 % (ref 4–5.6)
KAPPA LC FREE SER-MCNC: 1.88 MG/L (ref 0.26–1.65)
KAPPA LC FREE/LAMBDA FREE SER: 3.05 MG/DL (ref 0.33–1.94)
LAMBDA LC FREE SERPL-MCNC: 1.62 MG/DL (ref 0.57–2.63)
PROT SERPL-MCNC: 6.9 GM/DL (ref 6–8.4)

## 2025-07-16 LAB
PATHOLOGIST INTERPRETATION - IFE URINE (OHS): NORMAL
PATHOLOGIST REVIEW - SPE (OHS): NORMAL

## 2025-08-16 ENCOUNTER — HOSPITAL ENCOUNTER (OUTPATIENT)
Dept: SLEEP MEDICINE | Facility: HOSPITAL | Age: 65
Discharge: HOME | End: 2025-08-16
Attending: INTERNAL MEDICINE
Payer: MEDICARE

## 2025-08-16 DIAGNOSIS — I69.328 CVA, OLD, SPEECH/LANGUAGE DEFICIT: ICD-10-CM

## 2025-08-16 DIAGNOSIS — Z86.79 HISTORY OF ATRIAL FIBRILLATION: ICD-10-CM

## 2025-08-16 DIAGNOSIS — G47.33 OBSTRUCTIVE SLEEP APNEA SYNDROME: ICD-10-CM

## 2025-08-16 PROCEDURE — 95811 POLYSOM 6/>YRS CPAP 4/> PARM: CPT

## 2025-08-17 ENCOUNTER — PATIENT MESSAGE (OUTPATIENT)
Dept: PULMONOLOGY | Facility: CLINIC | Age: 65
End: 2025-08-17

## 2025-08-17 DIAGNOSIS — G47.33 OBSTRUCTIVE SLEEP APNEA SYNDROME: Primary | ICD-10-CM

## 2025-08-17 PROCEDURE — 95811 POLYSOM 6/>YRS CPAP 4/> PARM: CPT | Mod: 26,,, | Performed by: INTERNAL MEDICINE
